# Patient Record
Sex: FEMALE | Race: WHITE | NOT HISPANIC OR LATINO
[De-identification: names, ages, dates, MRNs, and addresses within clinical notes are randomized per-mention and may not be internally consistent; named-entity substitution may affect disease eponyms.]

---

## 2017-01-09 ENCOUNTER — RX RENEWAL (OUTPATIENT)
Age: 77
End: 2017-01-09

## 2018-05-01 ENCOUNTER — APPOINTMENT (OUTPATIENT)
Dept: RADIOLOGY | Facility: CLINIC | Age: 78
End: 2018-05-01

## 2018-05-01 ENCOUNTER — OUTPATIENT (OUTPATIENT)
Dept: OUTPATIENT SERVICES | Facility: HOSPITAL | Age: 78
LOS: 1 days | End: 2018-05-01
Payer: MEDICARE

## 2018-05-01 DIAGNOSIS — Z00.8 ENCOUNTER FOR OTHER GENERAL EXAMINATION: ICD-10-CM

## 2018-05-01 PROCEDURE — 72070 X-RAY EXAM THORAC SPINE 2VWS: CPT | Mod: 26

## 2018-05-01 PROCEDURE — 72070 X-RAY EXAM THORAC SPINE 2VWS: CPT

## 2018-05-24 ENCOUNTER — EMERGENCY (EMERGENCY)
Facility: HOSPITAL | Age: 78
LOS: 1 days | Discharge: HOME | End: 2018-05-24
Attending: EMERGENCY MEDICINE | Admitting: EMERGENCY MEDICINE
Payer: MEDICARE

## 2018-05-24 VITALS
SYSTOLIC BLOOD PRESSURE: 130 MMHG | HEIGHT: 63 IN | TEMPERATURE: 98 F | HEART RATE: 67 BPM | OXYGEN SATURATION: 95 % | RESPIRATION RATE: 16 BRPM | DIASTOLIC BLOOD PRESSURE: 92 MMHG | WEIGHT: 186.07 LBS

## 2018-05-24 VITALS
TEMPERATURE: 98 F | OXYGEN SATURATION: 98 % | SYSTOLIC BLOOD PRESSURE: 135 MMHG | DIASTOLIC BLOOD PRESSURE: 62 MMHG | RESPIRATION RATE: 16 BRPM | HEART RATE: 47 BPM

## 2018-05-24 DIAGNOSIS — I25.10 ATHEROSCLEROTIC HEART DISEASE OF NATIVE CORONARY ARTERY WITHOUT ANGINA PECTORIS: ICD-10-CM

## 2018-05-24 DIAGNOSIS — I10 ESSENTIAL (PRIMARY) HYPERTENSION: ICD-10-CM

## 2018-05-24 DIAGNOSIS — Z98.890 OTHER SPECIFIED POSTPROCEDURAL STATES: Chronic | ICD-10-CM

## 2018-05-24 DIAGNOSIS — Z95.1 PRESENCE OF AORTOCORONARY BYPASS GRAFT: Chronic | ICD-10-CM

## 2018-05-24 DIAGNOSIS — R00.2 PALPITATIONS: ICD-10-CM

## 2018-05-24 DIAGNOSIS — I48.91 UNSPECIFIED ATRIAL FIBRILLATION: ICD-10-CM

## 2018-05-24 DIAGNOSIS — J44.9 CHRONIC OBSTRUCTIVE PULMONARY DISEASE, UNSPECIFIED: ICD-10-CM

## 2018-05-24 LAB
ALBUMIN SERPL ELPH-MCNC: 3.5 G/DL — SIGNIFICANT CHANGE UP (ref 3.3–5)
ALP SERPL-CCNC: 52 U/L — SIGNIFICANT CHANGE UP (ref 30–120)
ALT FLD-CCNC: 31 U/L DA — SIGNIFICANT CHANGE UP (ref 10–60)
ANION GAP SERPL CALC-SCNC: 10 MMOL/L — SIGNIFICANT CHANGE UP (ref 5–17)
APTT BLD: 39.6 SEC — HIGH (ref 27.5–37.4)
AST SERPL-CCNC: 24 U/L — SIGNIFICANT CHANGE UP (ref 10–40)
BASOPHILS # BLD AUTO: 0 K/UL — SIGNIFICANT CHANGE UP (ref 0–0.2)
BASOPHILS NFR BLD AUTO: 0.6 % — SIGNIFICANT CHANGE UP (ref 0–2)
BILIRUB SERPL-MCNC: 0.5 MG/DL — SIGNIFICANT CHANGE UP (ref 0.2–1.2)
BUN SERPL-MCNC: 28 MG/DL — HIGH (ref 7–23)
CALCIUM SERPL-MCNC: 9.6 MG/DL — SIGNIFICANT CHANGE UP (ref 8.4–10.5)
CHLORIDE SERPL-SCNC: 103 MMOL/L — SIGNIFICANT CHANGE UP (ref 96–108)
CK MB BLD-MCNC: 1.7 % — SIGNIFICANT CHANGE UP (ref 0–3.5)
CK MB CFR SERPL CALC: 2.1 NG/ML — SIGNIFICANT CHANGE UP (ref 0–3.6)
CK SERPL-CCNC: 125 U/L — SIGNIFICANT CHANGE UP (ref 26–192)
CO2 SERPL-SCNC: 27 MMOL/L — SIGNIFICANT CHANGE UP (ref 22–31)
CREAT SERPL-MCNC: 0.7 MG/DL — SIGNIFICANT CHANGE UP (ref 0.5–1.3)
D DIMER BLD IA.RAPID-MCNC: <150 NG/ML DDU — SIGNIFICANT CHANGE UP
EOSINOPHIL # BLD AUTO: 0.1 K/UL — SIGNIFICANT CHANGE UP (ref 0–0.5)
EOSINOPHIL NFR BLD AUTO: 1.8 % — SIGNIFICANT CHANGE UP (ref 0–6)
GLUCOSE SERPL-MCNC: 112 MG/DL — HIGH (ref 70–99)
HCT VFR BLD CALC: 41.4 % — SIGNIFICANT CHANGE UP (ref 34.5–45)
HGB BLD-MCNC: 15 G/DL — SIGNIFICANT CHANGE UP (ref 11.5–15.5)
INR BLD: 1.48 RATIO — HIGH (ref 0.88–1.16)
LYMPHOCYTES # BLD AUTO: 1.4 K/UL — SIGNIFICANT CHANGE UP (ref 1–3.3)
LYMPHOCYTES # BLD AUTO: 23.3 % — SIGNIFICANT CHANGE UP (ref 13–44)
MCHC RBC-ENTMCNC: 32.1 PG — SIGNIFICANT CHANGE UP (ref 27–34)
MCHC RBC-ENTMCNC: 36.3 GM/DL — HIGH (ref 32–36)
MCV RBC AUTO: 88.6 FL — SIGNIFICANT CHANGE UP (ref 80–100)
MONOCYTES # BLD AUTO: 0.6 K/UL — SIGNIFICANT CHANGE UP (ref 0–0.9)
MONOCYTES NFR BLD AUTO: 10.5 % — SIGNIFICANT CHANGE UP (ref 2–14)
NEUTROPHILS # BLD AUTO: 3.7 K/UL — SIGNIFICANT CHANGE UP (ref 1.8–7.4)
NEUTROPHILS NFR BLD AUTO: 63.8 % — SIGNIFICANT CHANGE UP (ref 43–77)
NT-PROBNP SERPL-SCNC: 1096 PG/ML — HIGH (ref 0–450)
PLATELET # BLD AUTO: 115 K/UL — LOW (ref 150–400)
POTASSIUM SERPL-MCNC: 3.2 MMOL/L — LOW (ref 3.5–5.3)
POTASSIUM SERPL-SCNC: 3.2 MMOL/L — LOW (ref 3.5–5.3)
PROT SERPL-MCNC: 7.3 G/DL — SIGNIFICANT CHANGE UP (ref 6–8.3)
PROTHROM AB SERPL-ACNC: 16.3 SEC — HIGH (ref 9.8–12.7)
RBC # BLD: 4.68 M/UL — SIGNIFICANT CHANGE UP (ref 3.8–5.2)
RBC # FLD: 11.6 % — SIGNIFICANT CHANGE UP (ref 10.3–14.5)
SODIUM SERPL-SCNC: 140 MMOL/L — SIGNIFICANT CHANGE UP (ref 135–145)
TROPONIN I SERPL-MCNC: 0 NG/ML — LOW (ref 0.02–0.06)
WBC # BLD: 5.9 K/UL — SIGNIFICANT CHANGE UP (ref 3.8–10.5)
WBC # FLD AUTO: 5.9 K/UL — SIGNIFICANT CHANGE UP (ref 3.8–10.5)

## 2018-05-24 PROCEDURE — 93971 EXTREMITY STUDY: CPT | Mod: 26,LT

## 2018-05-24 PROCEDURE — 85730 THROMBOPLASTIN TIME PARTIAL: CPT

## 2018-05-24 PROCEDURE — 82550 ASSAY OF CK (CPK): CPT

## 2018-05-24 PROCEDURE — 83880 ASSAY OF NATRIURETIC PEPTIDE: CPT

## 2018-05-24 PROCEDURE — 85610 PROTHROMBIN TIME: CPT

## 2018-05-24 PROCEDURE — 84484 ASSAY OF TROPONIN QUANT: CPT

## 2018-05-24 PROCEDURE — 36415 COLL VENOUS BLD VENIPUNCTURE: CPT

## 2018-05-24 PROCEDURE — 83735 ASSAY OF MAGNESIUM: CPT

## 2018-05-24 PROCEDURE — 82553 CREATINE MB FRACTION: CPT

## 2018-05-24 PROCEDURE — 80053 COMPREHEN METABOLIC PANEL: CPT

## 2018-05-24 PROCEDURE — 93005 ELECTROCARDIOGRAM TRACING: CPT

## 2018-05-24 PROCEDURE — 99285 EMERGENCY DEPT VISIT HI MDM: CPT

## 2018-05-24 PROCEDURE — 85379 FIBRIN DEGRADATION QUANT: CPT

## 2018-05-24 PROCEDURE — 85027 COMPLETE CBC AUTOMATED: CPT

## 2018-05-24 PROCEDURE — 71045 X-RAY EXAM CHEST 1 VIEW: CPT | Mod: 26

## 2018-05-24 PROCEDURE — 99284 EMERGENCY DEPT VISIT MOD MDM: CPT

## 2018-05-24 PROCEDURE — 93971 EXTREMITY STUDY: CPT

## 2018-05-24 PROCEDURE — 71045 X-RAY EXAM CHEST 1 VIEW: CPT

## 2018-05-24 PROCEDURE — 99284 EMERGENCY DEPT VISIT MOD MDM: CPT | Mod: 25

## 2018-05-24 PROCEDURE — 93010 ELECTROCARDIOGRAM REPORT: CPT

## 2018-05-24 PROCEDURE — 96374 THER/PROPH/DIAG INJ IV PUSH: CPT

## 2018-05-24 RX ORDER — SODIUM CHLORIDE 9 MG/ML
3 INJECTION INTRAMUSCULAR; INTRAVENOUS; SUBCUTANEOUS ONCE
Qty: 0 | Refills: 0 | Status: COMPLETED | OUTPATIENT
Start: 2018-05-24 | End: 2018-05-24

## 2018-05-24 RX ORDER — POTASSIUM CHLORIDE 20 MEQ
40 PACKET (EA) ORAL ONCE
Qty: 0 | Refills: 0 | Status: COMPLETED | OUTPATIENT
Start: 2018-05-24 | End: 2018-05-24

## 2018-05-24 RX ADMIN — Medication 40 MILLIEQUIVALENT(S): at 08:50

## 2018-05-24 RX ADMIN — Medication 40 MILLIEQUIVALENT(S): at 07:03

## 2018-05-24 RX ADMIN — SODIUM CHLORIDE 3 MILLILITER(S): 9 INJECTION INTRAMUSCULAR; INTRAVENOUS; SUBCUTANEOUS at 05:35

## 2018-05-24 NOTE — CONSULT NOTE ADULT - PROBLEM SELECTOR RECOMMENDATION 3
controlled currently , advised the patient to be compliant to diet as she is not compliant to diet which is contributing to her labile BP ,

## 2018-05-24 NOTE — CONSULT NOTE ADULT - PROBLEM SELECTOR RECOMMENDATION 2
PAF . with ST changes suggestive of ischemia possibly due to demand  underlying CAD , no evidence of chest pain or myocardial injury ,   K supplement , Magnesium level , advised to monitor electrolytes periodically    patient currently in sinus rhythm continue her home medication ,

## 2018-05-24 NOTE — ED PROVIDER NOTE - GASTROINTESTINAL, MLM
Patient presented after waiting 30 minutes with no reaction to  injections. Discharged from clinic.   Juju Landry RN  
Abdomen soft, non-tender, no guarding.

## 2018-05-24 NOTE — ED ADULT NURSE NOTE - DISCHARGE TEACHING
rest today, F/U with cardiologist as instructed, copies of all tests given to patient along with EKG's

## 2018-05-24 NOTE — ED ADULT NURSE NOTE - PMH
Atrial fibrillation    Chronic obstructive pulmonary disease    Coronary artery disease    Hypertension

## 2018-05-24 NOTE — ED PROVIDER NOTE - PROGRESS NOTE DETAILS
while speaking with pt appears to have converted to NSR - EKG, reassess Pt seen by cardiology and cleared for discharge home with outpatient follow up.  Has remained in NSR.  Stable for discharge home with outpatient follow up.  Copy of results provided, all questions answered

## 2018-05-24 NOTE — ED ADULT NURSE NOTE - OBJECTIVE STATEMENT
78 yr old female c/o irregular heartbeat; pt states she woke up to use restroom at home and felt her heart beating irregular; hx of Afib. Pt states she started to cough and thought maybe she had fluid in the lungs so came to ED for evaluation.

## 2018-05-24 NOTE — ED PROVIDER NOTE - OBJECTIVE STATEMENT
78 y.o. F c/o palpitations 78 y.o. F c/o palpitations, has h/o paroxysmal afib, again in afib now for about 2 hours, took a metoprolol 25mg when it started, thinks it might be a little 78 y.o. F c/o palpitations, has h/o paroxysmal afib, again in afib now for about 2 hours, took a metoprolol 25mg when it started, thinks it might be a little better now, but also feels a little SOB, not like her COPD, feels wet in the lungs, like fluid, has had a mild cough since the HR went up, no recent illness, did drive to and from [place] recently, last time she was in Afib was transferred to [place] for admission (Cards = Mec[facility])    PCP = Cipriano 78 y.o. F c/o palpitations, has h/o paroxysmal afib, again in afib now for about 2 hours, took a metoprolol 25mg when it started, thinks it might be a little better now, but also feels a little SOB, not like her COPD, feels wet in the lungs, like fluid, has had a mild cough since the HR went up, no recent illness, did drive to and from [place], returning last night, was her granddaughter's graduation, did have alcohol while there, last time she was in Afib was transferred to [place] for admission (Cards = Meche)    PCP = Cipriano

## 2018-05-24 NOTE — ED ADULT NURSE REASSESSMENT NOTE - NS ED NURSE REASSESS COMMENT FT1
07:30-patient on stretcher TR4 in ER awaiting to see cardiologist.  Patient converted from A-Fib to NSR.  Patient comfortable and denies pain at this time.

## 2018-05-24 NOTE — CONSULT NOTE ADULT - PROBLEM SELECTOR RECOMMENDATION 4
no chest pain or shortness of breath , with negative initial troponin ,   EKG changes possible due to rate related demand ischemia ,   would recommend her to be followed with her O P cardiologist echo and nuclear stress test,     can be discharged if her repeat troponin is negative

## 2018-05-24 NOTE — CONSULT NOTE ADULT - SUBJECTIVE AND OBJECTIVE BOX
REASON FOR CONSULT: AFIB    CHIEF COMPLAINT: palpitation     HPI: 78 year old female with hx of CAD CABG x 4 v  PAF HTN  HLD , COPD  obesity who came to hospital she developed palpitations started last night , was rapid irregular , not associated with shortness of breath or chest pain or dizziness , was not going away , brought in to ER  , where she was noted to have atrial fibrillation with rapid ventricular rate , patient recieved  10 mg IV cardizem  which converted to sinus , patient symptoms resolved , patients blood work showed hypokalemia , with mild pre renal azotemia.  negative troponin , patient currently has controlled BP , sinus bradycardia       PAST MEDICAL & SURGICAL HISTORY:  Hypertension  Coronary artery disease  Atrial fibrillation  Chronic obstructive pulmonary disease  S/P repair of paraesophageal hernia  History of quadruple bypass      Allergies    sulfa drugs (Other)    Intolerances        SOCIAL HISTORY: former smoker     FAMILY HISTORY: Not significant       MEDICATIONS:    Home Medications:  folic acid 1 mg oral tablet: 1 tab(s) orally once a day (24 May 2018 07:34)  hydroCHLOROthiazide 12.5 mg oral tablet: orally 2 times a day (24 May 2018 07:34)  labetalol 100 mg oral tablet: 1 tab(s) orally 2 times a day (24 May 2018 07:34)  Lipitor 40 mg oral tablet: 1 tab(s) orally once a day (at bedtime) (24 May 2018 07:34)  losartan 50 mg oral tablet: orally 2 times a day (24 May 2018 07:34)  sertraline: 25 milligram(s) orally (24 May 2018 07:34)  Symbicort: inhaled 2 times a day (24 May 2018 07:34)  Vitamin D3 10,000 intl units oral capsule: 1 cap(s) orally once a week (24 May 2018 07:34)  Xarelto 20 mg oral tablet: 1 tab(s) orally once a day (in the evening) (24 May 2018 07:34)  Zetia 10 mg oral tablet: 3xweek (24 May 2018 07:34)    MEDICATIONS  (STANDING):    MEDICATIONS  (PRN):      REVIEW OF SYSTEMS:    chronic back pain ,   All other review of systems is negative unless indicated above    Vital Signs Last 24 Hrs  T(C): 36.6 (24 May 2018 07:30), Max: 36.7 (24 May 2018 05:04)  T(F): 97.9 (24 May 2018 07:30), Max: 98 (24 May 2018 05:04)  HR: 49 (24 May 2018 07:30) (49 - 103)  BP: 118/76 (24 May 2018 07:30) (118/76 - 161/73)  BP(mean): --  RR: 16 (24 May 2018 07:30) (14 - 19)  SpO2: 97% (24 May 2018 07:30) (94% - 97%)    I&O's Summary      PHYSICAL EXAM:    Constitutional: NAD, awake and alert, obesity   HEENT: PERR, EOMI,  No oral cyananosis.  Neck:  supple,  No JVD  Respiratory: Breath sounds are clear bilaterally, No wheezing, rales or rhonchi  Cardiovascular: S1 and S2, regular rate and rhythm, no Murmurs, gallops or rubs  Gastrointestinal: Bowel Sounds present, soft, nontender.   Extremities: No peripheral edema. No clubbing or cyanosis.  Vascular: 2+ peripheral pulses  Neurological: A/O x 3, no focal deficits  Musculoskeletal: no calf tenderness.  Skin: No rashes.      LABS: All Labs Reviewed:                        15.0   5.9   )-----------( 115      ( 24 May 2018 05:52 )             41.4     24 May 2018 05:52    140    |  103    |  28     ----------------------------<  112    3.2     |  27     |  0.70     Ca    9.6        24 May 2018 05:52    TPro  7.3    /  Alb  3.5    /  TBili  0.5    /  DBili  x      /  AST  24     /  ALT  31     /  AlkPhos  52     24 May 2018 05:52    PT/INR - ( 24 May 2018 05:52 )   PT: 16.3 sec;   INR: 1.48 ratio         PTT - ( 24 May 2018 05:52 )  PTT:39.6 sec  CARDIAC MARKERS ( 24 May 2018 05:52 )  .004 ng/mL / x     / 125 U/L / x     / 2.1 ng/mL      Blood Culture:    @ 05:52  Pro Bnp 1096        RADIOLOGY/EK18  5:21 AM  atrial fibrillation with mild rapid rate 108  AS infarct , ST depression Inferolateral leads  18  6:59 AM  sinus bradycardia AS infarct , resolved ST depression

## 2018-05-24 NOTE — ED ADULT TRIAGE NOTE - CHIEF COMPLAINT QUOTE
'I am having afib,  my systolic and diastolic blood pressure is high, I am feeling fluid in my throat'.

## 2018-05-24 NOTE — ED PROVIDER NOTE - PMH
Atrial fibrillation    Chronic obstructive pulmonary disease Atrial fibrillation    Chronic obstructive pulmonary disease    Coronary artery disease    Hypertension

## 2018-05-24 NOTE — ED ADULT NURSE NOTE - CHPI ED SYMPTOMS NEG
no shortness of breath/no vomiting/no dizziness/no fever/no chills/no back pain/no chest pain/no diaphoresis/no nausea/no syncope

## 2018-07-16 ENCOUNTER — APPOINTMENT (OUTPATIENT)
Dept: ORTHOPEDIC SURGERY | Facility: CLINIC | Age: 78
End: 2018-07-16
Payer: MEDICARE

## 2018-07-16 VITALS — RESPIRATION RATE: 16 BRPM | BODY MASS INDEX: 32.6 KG/M2 | WEIGHT: 184 LBS | HEIGHT: 63 IN

## 2018-07-16 DIAGNOSIS — M19.042 PRIMARY OSTEOARTHRITIS, LEFT HAND: ICD-10-CM

## 2018-07-16 DIAGNOSIS — M19.041 PRIMARY OSTEOARTHRITIS, RIGHT HAND: ICD-10-CM

## 2018-07-16 PROCEDURE — 73110 X-RAY EXAM OF WRIST: CPT | Mod: 50

## 2018-07-16 PROCEDURE — 99214 OFFICE O/P EST MOD 30 MIN: CPT

## 2018-07-20 ENCOUNTER — EMERGENCY (EMERGENCY)
Facility: HOSPITAL | Age: 78
LOS: 1 days | Discharge: HOME | End: 2018-07-20
Attending: EMERGENCY MEDICINE | Admitting: EMERGENCY MEDICINE
Payer: MEDICARE

## 2018-07-20 VITALS
OXYGEN SATURATION: 97 % | HEART RATE: 57 BPM | WEIGHT: 179.9 LBS | HEIGHT: 63 IN | DIASTOLIC BLOOD PRESSURE: 43 MMHG | SYSTOLIC BLOOD PRESSURE: 87 MMHG | RESPIRATION RATE: 16 BRPM | TEMPERATURE: 98 F

## 2018-07-20 VITALS — TEMPERATURE: 98 F | SYSTOLIC BLOOD PRESSURE: 138 MMHG | HEART RATE: 58 BPM | DIASTOLIC BLOOD PRESSURE: 74 MMHG

## 2018-07-20 DIAGNOSIS — Z95.1 PRESENCE OF AORTOCORONARY BYPASS GRAFT: Chronic | ICD-10-CM

## 2018-07-20 DIAGNOSIS — Z98.890 OTHER SPECIFIED POSTPROCEDURAL STATES: Chronic | ICD-10-CM

## 2018-07-20 PROBLEM — I48.91 UNSPECIFIED ATRIAL FIBRILLATION: Chronic | Status: ACTIVE | Noted: 2018-05-24

## 2018-07-20 PROBLEM — J44.9 CHRONIC OBSTRUCTIVE PULMONARY DISEASE, UNSPECIFIED: Chronic | Status: ACTIVE | Noted: 2018-05-24

## 2018-07-20 PROBLEM — I10 ESSENTIAL (PRIMARY) HYPERTENSION: Chronic | Status: ACTIVE | Noted: 2018-05-24

## 2018-07-20 PROBLEM — I25.10 ATHEROSCLEROTIC HEART DISEASE OF NATIVE CORONARY ARTERY WITHOUT ANGINA PECTORIS: Chronic | Status: ACTIVE | Noted: 2018-05-24

## 2018-07-20 LAB
ALBUMIN SERPL ELPH-MCNC: 3.7 G/DL — SIGNIFICANT CHANGE UP (ref 3.3–5)
ALP SERPL-CCNC: 43 U/L — SIGNIFICANT CHANGE UP (ref 30–120)
ALT FLD-CCNC: 33 U/L DA — SIGNIFICANT CHANGE UP (ref 10–60)
ANION GAP SERPL CALC-SCNC: 3 MMOL/L — LOW (ref 5–17)
APPEARANCE UR: CLEAR — SIGNIFICANT CHANGE UP
AST SERPL-CCNC: 24 U/L — SIGNIFICANT CHANGE UP (ref 10–40)
BACTERIA # UR AUTO: ABNORMAL
BILIRUB SERPL-MCNC: 0.4 MG/DL — SIGNIFICANT CHANGE UP (ref 0.2–1.2)
BILIRUB UR-MCNC: NEGATIVE — SIGNIFICANT CHANGE UP
BUN SERPL-MCNC: 28 MG/DL — HIGH (ref 7–23)
CALCIUM SERPL-MCNC: 9.4 MG/DL — SIGNIFICANT CHANGE UP (ref 8.4–10.5)
CHLORIDE SERPL-SCNC: 102 MMOL/L — SIGNIFICANT CHANGE UP (ref 96–108)
CK MB BLD-MCNC: 2.1 % — SIGNIFICANT CHANGE UP (ref 0–3.5)
CK MB CFR SERPL CALC: 1.5 NG/ML — SIGNIFICANT CHANGE UP (ref 0–3.6)
CK SERPL-CCNC: 71 U/L — SIGNIFICANT CHANGE UP (ref 26–192)
CO2 SERPL-SCNC: 30 MMOL/L — SIGNIFICANT CHANGE UP (ref 22–31)
COLOR SPEC: YELLOW — SIGNIFICANT CHANGE UP
CREAT SERPL-MCNC: 0.87 MG/DL — SIGNIFICANT CHANGE UP (ref 0.5–1.3)
DIFF PNL FLD: NEGATIVE — SIGNIFICANT CHANGE UP
EPI CELLS # UR: SIGNIFICANT CHANGE UP
GLUCOSE SERPL-MCNC: 90 MG/DL — SIGNIFICANT CHANGE UP (ref 70–99)
GLUCOSE UR QL: NEGATIVE MG/DL — SIGNIFICANT CHANGE UP
HCT VFR BLD CALC: 41.7 % — SIGNIFICANT CHANGE UP (ref 34.5–45)
HGB BLD-MCNC: 14.1 G/DL — SIGNIFICANT CHANGE UP (ref 11.5–15.5)
INR BLD: 1.25 RATIO — HIGH (ref 0.88–1.16)
KETONES UR-MCNC: NEGATIVE — SIGNIFICANT CHANGE UP
LEUKOCYTE ESTERASE UR-ACNC: ABNORMAL
MCHC RBC-ENTMCNC: 31.3 PG — SIGNIFICANT CHANGE UP (ref 27–34)
MCHC RBC-ENTMCNC: 33.8 GM/DL — SIGNIFICANT CHANGE UP (ref 32–36)
MCV RBC AUTO: 92.5 FL — SIGNIFICANT CHANGE UP (ref 80–100)
NITRITE UR-MCNC: NEGATIVE — SIGNIFICANT CHANGE UP
NRBC # BLD: 0 /100 WBCS — SIGNIFICANT CHANGE UP (ref 0–0)
PH UR: 7 — SIGNIFICANT CHANGE UP (ref 5–8)
PLATELET # BLD AUTO: 118 K/UL — LOW (ref 150–400)
POTASSIUM SERPL-MCNC: 3.9 MMOL/L — SIGNIFICANT CHANGE UP (ref 3.5–5.3)
POTASSIUM SERPL-SCNC: 3.9 MMOL/L — SIGNIFICANT CHANGE UP (ref 3.5–5.3)
PROT SERPL-MCNC: 7.1 G/DL — SIGNIFICANT CHANGE UP (ref 6–8.3)
PROT UR-MCNC: NEGATIVE MG/DL — SIGNIFICANT CHANGE UP
PROTHROM AB SERPL-ACNC: 13.7 SEC — HIGH (ref 9.8–12.7)
RBC # BLD: 4.51 M/UL — SIGNIFICANT CHANGE UP (ref 3.8–5.2)
RBC # FLD: 12.8 % — SIGNIFICANT CHANGE UP (ref 10.3–14.5)
RBC CASTS # UR COMP ASSIST: SIGNIFICANT CHANGE UP /HPF (ref 0–4)
SODIUM SERPL-SCNC: 135 MMOL/L — SIGNIFICANT CHANGE UP (ref 135–145)
SP GR SPEC: 1 — LOW (ref 1.01–1.02)
TROPONIN I SERPL-MCNC: 0 NG/ML — LOW (ref 0.02–0.06)
UROBILINOGEN FLD QL: NEGATIVE MG/DL — SIGNIFICANT CHANGE UP
WBC # BLD: 5.99 K/UL — SIGNIFICANT CHANGE UP (ref 3.8–10.5)
WBC # FLD AUTO: 5.99 K/UL — SIGNIFICANT CHANGE UP (ref 3.8–10.5)
WBC UR QL: SIGNIFICANT CHANGE UP

## 2018-07-20 PROCEDURE — 99284 EMERGENCY DEPT VISIT MOD MDM: CPT

## 2018-07-20 PROCEDURE — 99284 EMERGENCY DEPT VISIT MOD MDM: CPT | Mod: 25

## 2018-07-20 PROCEDURE — 80053 COMPREHEN METABOLIC PANEL: CPT

## 2018-07-20 PROCEDURE — 84484 ASSAY OF TROPONIN QUANT: CPT

## 2018-07-20 PROCEDURE — 82553 CREATINE MB FRACTION: CPT

## 2018-07-20 PROCEDURE — 93005 ELECTROCARDIOGRAM TRACING: CPT

## 2018-07-20 PROCEDURE — 85027 COMPLETE CBC AUTOMATED: CPT

## 2018-07-20 PROCEDURE — 81001 URINALYSIS AUTO W/SCOPE: CPT

## 2018-07-20 PROCEDURE — 85610 PROTHROMBIN TIME: CPT

## 2018-07-20 PROCEDURE — 82550 ASSAY OF CK (CPK): CPT

## 2018-07-20 PROCEDURE — 93010 ELECTROCARDIOGRAM REPORT: CPT

## 2018-07-20 RX ORDER — LABETALOL HCL 100 MG
1 TABLET ORAL
Qty: 0 | Refills: 0 | COMMUNITY

## 2018-07-20 RX ORDER — BUDESONIDE AND FORMOTEROL FUMARATE DIHYDRATE 160; 4.5 UG/1; UG/1
0 AEROSOL RESPIRATORY (INHALATION)
Qty: 0 | Refills: 0 | COMMUNITY

## 2018-07-20 RX ORDER — LOSARTAN POTASSIUM 100 MG/1
0 TABLET, FILM COATED ORAL
Qty: 0 | Refills: 0 | COMMUNITY

## 2018-07-20 RX ORDER — SODIUM CHLORIDE 9 MG/ML
1000 INJECTION INTRAMUSCULAR; INTRAVENOUS; SUBCUTANEOUS ONCE
Qty: 0 | Refills: 0 | Status: COMPLETED | OUTPATIENT
Start: 2018-07-20 | End: 2018-07-20

## 2018-07-20 RX ORDER — SERTRALINE 25 MG/1
25 TABLET, FILM COATED ORAL
Qty: 0 | Refills: 0 | COMMUNITY

## 2018-07-20 RX ADMIN — SODIUM CHLORIDE 1000 MILLILITER(S): 9 INJECTION INTRAMUSCULAR; INTRAVENOUS; SUBCUTANEOUS at 15:55

## 2018-07-20 NOTE — ED PROVIDER NOTE - PROGRESS NOTE DETAILS
paged Dr. Álvarez [phone], awaiting call back spoke with Dr. Rodriguez, kojo discussed, patient to hold losartan and hctz, will see in the office on monday

## 2018-07-20 NOTE — ED PROVIDER NOTE - OBJECTIVE STATEMENT
79 y/o F pt with hx of vaso-vagal, Quadruple Bypass, CAD with Cardiac stents, A-Fib, COPD, HTN presents to ED c/o lightheadedness, weakness starting approximately 12:30pm today. Due to symptoms she took her blood pressure noted to be low at 77/54; called her cardiologist and advised to come to ED. Currently she is feeling better. Woke up feeling normal, took medication Labetalol, losartan, hydrochlorothiazide at 11:30am. Medication decreased over the past few weeks. No Lasix medication. Former Smoker. Denies dizziness, chest pain. No further complaints at this time.   Card:  Dr. Álvarez [phone]

## 2018-07-20 NOTE — ED PROVIDER NOTE - NS_ ATTENDINGSCRIBEDETAILS _ED_A_ED_FT
The scribe's documentation has been prepared under my direction and personally reviewed by me in its entirety. I confirm that the note above accurately reflects all work, treatment, procedures, and medical decision making performed by me (Dr. Rojas).

## 2019-04-22 ENCOUNTER — OUTPATIENT (OUTPATIENT)
Dept: OUTPATIENT SERVICES | Facility: HOSPITAL | Age: 79
LOS: 1 days | End: 2019-04-22
Payer: MEDICARE

## 2019-04-22 ENCOUNTER — APPOINTMENT (OUTPATIENT)
Dept: RADIOLOGY | Facility: CLINIC | Age: 79
End: 2019-04-22
Payer: MEDICARE

## 2019-04-22 DIAGNOSIS — Z98.890 OTHER SPECIFIED POSTPROCEDURAL STATES: Chronic | ICD-10-CM

## 2019-04-22 DIAGNOSIS — Z00.8 ENCOUNTER FOR OTHER GENERAL EXAMINATION: ICD-10-CM

## 2019-04-22 DIAGNOSIS — Z95.1 PRESENCE OF AORTOCORONARY BYPASS GRAFT: Chronic | ICD-10-CM

## 2019-04-22 PROCEDURE — 27093 INJECTION FOR HIP X-RAY: CPT | Mod: RT

## 2019-04-22 PROCEDURE — 27093 INJECTION FOR HIP X-RAY: CPT

## 2019-04-22 PROCEDURE — 73525 CONTRAST X-RAY OF HIP: CPT

## 2019-04-22 PROCEDURE — 73525 CONTRAST X-RAY OF HIP: CPT | Mod: 26,RT

## 2019-08-16 ENCOUNTER — EMERGENCY (EMERGENCY)
Facility: HOSPITAL | Age: 79
LOS: 1 days | Discharge: HOME | End: 2019-08-16
Attending: EMERGENCY MEDICINE | Admitting: EMERGENCY MEDICINE
Payer: MEDICARE

## 2019-08-16 VITALS
RESPIRATION RATE: 16 BRPM | WEIGHT: 171.96 LBS | DIASTOLIC BLOOD PRESSURE: 85 MMHG | TEMPERATURE: 98 F | OXYGEN SATURATION: 98 % | SYSTOLIC BLOOD PRESSURE: 162 MMHG | HEIGHT: 63 IN | HEART RATE: 58 BPM

## 2019-08-16 VITALS
DIASTOLIC BLOOD PRESSURE: 82 MMHG | TEMPERATURE: 98 F | RESPIRATION RATE: 15 BRPM | HEART RATE: 64 BPM | OXYGEN SATURATION: 97 % | SYSTOLIC BLOOD PRESSURE: 160 MMHG

## 2019-08-16 DIAGNOSIS — Z98.890 OTHER SPECIFIED POSTPROCEDURAL STATES: Chronic | ICD-10-CM

## 2019-08-16 DIAGNOSIS — Z95.1 PRESENCE OF AORTOCORONARY BYPASS GRAFT: Chronic | ICD-10-CM

## 2019-08-16 PROCEDURE — 96375 TX/PRO/DX INJ NEW DRUG ADDON: CPT

## 2019-08-16 PROCEDURE — 99284 EMERGENCY DEPT VISIT MOD MDM: CPT

## 2019-08-16 PROCEDURE — 72100 X-RAY EXAM L-S SPINE 2/3 VWS: CPT | Mod: 26

## 2019-08-16 PROCEDURE — 99284 EMERGENCY DEPT VISIT MOD MDM: CPT | Mod: 25

## 2019-08-16 PROCEDURE — 96374 THER/PROPH/DIAG INJ IV PUSH: CPT

## 2019-08-16 PROCEDURE — 72100 X-RAY EXAM L-S SPINE 2/3 VWS: CPT

## 2019-08-16 PROCEDURE — 72170 X-RAY EXAM OF PELVIS: CPT

## 2019-08-16 PROCEDURE — 72170 X-RAY EXAM OF PELVIS: CPT | Mod: 26

## 2019-08-16 RX ORDER — LIDOCAINE 4 G/100G
1 CREAM TOPICAL ONCE
Refills: 0 | Status: COMPLETED | OUTPATIENT
Start: 2019-08-16 | End: 2019-08-16

## 2019-08-16 RX ORDER — HYDROMORPHONE HYDROCHLORIDE 2 MG/ML
0.5 INJECTION INTRAMUSCULAR; INTRAVENOUS; SUBCUTANEOUS ONCE
Refills: 0 | Status: DISCONTINUED | OUTPATIENT
Start: 2019-08-16 | End: 2019-08-16

## 2019-08-16 RX ORDER — DEXAMETHASONE 0.5 MG/5ML
10 ELIXIR ORAL ONCE
Refills: 0 | Status: COMPLETED | OUTPATIENT
Start: 2019-08-16 | End: 2019-08-16

## 2019-08-16 RX ORDER — LOSARTAN POTASSIUM 100 MG/1
1 TABLET, FILM COATED ORAL
Qty: 0 | Refills: 0 | DISCHARGE

## 2019-08-16 RX ADMIN — LIDOCAINE 1 PATCH: 4 CREAM TOPICAL at 16:29

## 2019-08-16 RX ADMIN — HYDROMORPHONE HYDROCHLORIDE 0.5 MILLIGRAM(S): 2 INJECTION INTRAMUSCULAR; INTRAVENOUS; SUBCUTANEOUS at 16:29

## 2019-08-16 RX ADMIN — Medication 102 MILLIGRAM(S): at 16:29

## 2019-08-16 NOTE — ED PROVIDER NOTE - NEUROLOGICAL, MLM
Alert and oriented, no focal deficits, no motor or sensory deficits. able to SLR. able to stand from sitting position. no toe or foot drop

## 2019-08-16 NOTE — ED PROVIDER NOTE - ATTENDING CONTRIBUTION TO CARE
pt with acute on chronic LBP. no new trauma, no urinary symptoms, no fevers.  pain control dc with pain management and ortho.

## 2019-08-16 NOTE — ED PROVIDER NOTE - CHPI ED SYMPTOMS NEG
no bladder dysfunction/no numbness/no bowel dysfunction/no motor function loss/no tingling/no anorexia/no constipation

## 2019-08-16 NOTE — ED PROVIDER NOTE - NEURO NEGATIVE STATEMENT, MLM
no headache, no sensory deficits, and no weakness. no loss or bowel or bladder control. no numbness in groin

## 2019-08-16 NOTE — ED ADULT NURSE NOTE - NSIMPLEMENTINTERV_GEN_ALL_ED
Implemented All Fall with Harm Risk Interventions:  [place] to call system. Call bell, personal items and telephone within reach. Instruct patient to call for assistance. Room bathroom lighting operational. Non-slip footwear when patient is off stretcher. Physically safe environment: no spills, clutter or unnecessary equipment. Stretcher in lowest position, wheels locked, appropriate side rails in place. Provide visual cue, wrist band, yellow gown, etc. Monitor gait and stability. Monitor for mental status changes and reorient to person, place, and time. Review medications for side effects contributing to fall risk. Reinforce activity limits and safety measures with patient and family. Provide visual clues: red socks.

## 2019-08-16 NOTE — ED PROVIDER NOTE - MUSCULOSKELETAL, MLM
diffuse soft tissue tenderness bilateral lumbar region (left worse than right). no vert tenderness or step off deformities. no ext tenderness or swelling

## 2019-08-16 NOTE — ED PROVIDER NOTE - PROGRESS NOTE DETAILS
Reevaluated patient at bedside.  Patient feeling much improved.  ambulatory with steady gait. Discussed the results of all diagnostic testing in ED and copies of all reports given.   An opportunity to ask questions was given.  Discussed the importance of prompt, close medical follow-up.  Patient will return with any changes, concerns or persistent / worsening symptoms.  Understanding of all instructions verbalized.  will follow up with pain management in 4 days as scheduled. has pain meds at home to use as needed and medrol dose pack to resume

## 2019-08-16 NOTE — ED PROVIDER NOTE - CLINICAL SUMMARY MEDICAL DECISION MAKING FREE TEXT BOX
acute exacerbation of chronic low back pain past 5 days after lying in bed for prolonged period. no red flags. no not suspect cord compression or cauda equina syndrome. halie x-ray, decadron, Dilaudid for pain. no fevers or redness to suggest infection

## 2019-08-16 NOTE — ED ADULT TRIAGE NOTE - HEART RATE (BEATS/MIN)
OFFICE PROGRESS NOTE / FOLLOW UP    Chelsy Umana is a 74 year old female who presented requesting follow up evaluation for right shoulder pain and right middle finger trigger. Patient reports trigger finger has improved but continues to lock at times.  The right shoulder is improving but knitting does irritate.  Patient scheduled to leave for [place] for 2-3 months next week.  Pain overall improved.  Intervention:  CSI, PT  Improvement:  Yes      Review of Systems:   no history of neck pain.  no history of radicular pain into upper extremities.    Current Outpatient Prescriptions   Medication   • doxepin (SINEQUAN) 10 MG capsule   • warfarin (COUMADIN) 5 MG tablet   • bisoprolol (ZEBETA) 5 MG tablet   • timolol (TIMOPTIC) 0.5 % ophthalmic solution   • azelaic acid 15 % gel   • Coenzyme Q10 (COQ10 PO)   • multivitamin (THERAGRAN) per tablet   • Calcium Carbonate (CALTRATE 600 PO)   • Omega-3 Fatty Acids (OMEGA-3 FISH OIL) 1000 MG CAPS     No current facility-administered medications for this visit.      Past Medical History:   Diagnosis Date   • Actinic keratosis     nose, right cheek   • Arthritis    • Blood clot associated with vein wall inflammation     PE X 2   • Colon polyp     03/2010   • Corns and callosities     left foot little toe   • Diverticulosis 4/15/2015   • Fibrocystic breast disease    • Hyperlipidemia    • Hypertension    • Inflammatory bowel disease    • Irritable bowel syndrome    • Malignant neoplasm (CMS/HCC) 2013    FACE, BACK BASAL CELL   • Osteoporosis    • Osteoporosis, unspecified 12/17/2012   • PE (pulmonary embolism)    • Rosacea    • Seborrheic keratosis    • Solar lentigo      Past Surgical History:   Procedure Laterality Date   • APPENDECTOMY     • COLONOSCOPY  4/15/15    repeat 5 to  7 years   • COLONOSCOPY W/ POLYPECTOMY  03/02/2010    rectal polyp   • EYE SURGERY     • JOINT REPLACEMENT      r knee   • LEFT HEART CATH,PERCUTANEOUS  09/14/2010   • OCCULT BLOOD  01/15/2009   • PAP  SMEAR,ROUTINE  06/15/2012   • PTCA      ptca   • SKIN CANCER EXCISION  2016; 1/07/2015; 2013    back; foreheadl left leg   • TOTAL KNEE REPLACEMENT  04/11/2011    Right knee   • TUBAL LIGATION       Social History     Occupational History   • Not on file.     Social History Main Topics   • Smoking status: Former Smoker   • Smokeless tobacco: Never Used   • Alcohol use 0.0 oz/week      Comment: rarely   • Drug use: Unknown   • Sexual activity: Not on file     ALLERGIES:   Allergen Reactions   • Adhesive RASH     Family History   Problem Relation Age of Onset   • Arthritis Mother    • OTHER Mother    • Diabetes Father    • Heart Father    • Cancer Son      skin   • Cancer Daughter      skin       There were no vitals taken for this visit.    OT / Measurements:  Digit ROM WFL with triggering of MF    Measurements from PT note dated 1/3/18  Active right shoulder flexion:      full                      Reach behind the back:  Reaches to approximately T5 with minimal discomfort      Right middle finger:  Continued triggering with active motion. Mild discomfort    Assessment:  Resolved shoulder pain  Continued right middle trigger finger    Plan:  Patient states she would like to try another trigger finger injection. She is leaving for 4 to  . If she continues to have symptoms she will return in the spring and consider surgery.    At this point since the patient is experiencing progressively worsening triggering symptoms, a steroid injection was recommended.  The procedure was outlined fully.  The risks of the procedure including:  Injury to nerve, artery, tendon, infection, and the possibility of failure of a complete resolution of symptoms were outlined to the patient.      Trigger Finger Injection Procedure Note  NDC #0517-072 0-0 1  Lot number:  [id number]    Indications:  The patient has symptoms of right middle trigger digit    Pre-procedure Diagnosis:  Right middle Trigger Finger    Post-procedure Diagnosis:   Same    Surgeon:  LISA FINE     Assistants:  None    Anesthesia:  Local    Procedure Details   Verbal consent was obtained from the patient after risks, benefits, and alternatives were discussed.  The patient's affected digit was marked over the A-1 pulley and after sterile prep with ChloraPrep.  A mix of 1 mL of 2% lidocaine and 1 mL of  6 mg of Celestone (total 2 mL) was infiltrated into the area of the A1 pulley.  Area was cleansed and Band-Aid was applied.           Complications:  None; patient tolerated the procedure well.                        58

## 2019-08-16 NOTE — ED ADULT NURSE NOTE - CHPI ED NUR SYMPTOMS NEG
no bowel dysfunction/no bladder dysfunction/no anorexia/no fatigue/no neck tenderness/no motor function loss/no numbness/no constipation/no tingling

## 2019-08-23 ENCOUNTER — APPOINTMENT (OUTPATIENT)
Dept: ORTHOPEDIC SURGERY | Facility: CLINIC | Age: 79
End: 2019-08-23

## 2019-09-03 ENCOUNTER — OUTPATIENT (OUTPATIENT)
Dept: OUTPATIENT SERVICES | Facility: HOSPITAL | Age: 79
LOS: 1 days | End: 2019-09-03
Payer: MEDICARE

## 2019-09-03 ENCOUNTER — APPOINTMENT (OUTPATIENT)
Dept: CT IMAGING | Facility: CLINIC | Age: 79
End: 2019-09-03
Payer: MEDICARE

## 2019-09-03 ENCOUNTER — APPOINTMENT (OUTPATIENT)
Dept: SPINE | Facility: CLINIC | Age: 79
End: 2019-09-03
Payer: MEDICARE

## 2019-09-03 VITALS
DIASTOLIC BLOOD PRESSURE: 78 MMHG | WEIGHT: 163 LBS | HEIGHT: 63 IN | BODY MASS INDEX: 28.88 KG/M2 | HEART RATE: 49 BPM | SYSTOLIC BLOOD PRESSURE: 167 MMHG

## 2019-09-03 DIAGNOSIS — Z95.1 PRESENCE OF AORTOCORONARY BYPASS GRAFT: Chronic | ICD-10-CM

## 2019-09-03 DIAGNOSIS — S22.009A UNSPECIFIED FRACTURE OF UNSPECIFIED THORACIC VERTEBRA, INITIAL ENCOUNTER FOR CLOSED FRACTURE: ICD-10-CM

## 2019-09-03 DIAGNOSIS — Z98.890 OTHER SPECIFIED POSTPROCEDURAL STATES: Chronic | ICD-10-CM

## 2019-09-03 PROCEDURE — 76376 3D RENDER W/INTRP POSTPROCES: CPT | Mod: 26

## 2019-09-03 PROCEDURE — 99203 OFFICE O/P NEW LOW 30 MIN: CPT

## 2019-09-03 PROCEDURE — 76376 3D RENDER W/INTRP POSTPROCES: CPT

## 2019-09-03 PROCEDURE — 72128 CT CHEST SPINE W/O DYE: CPT

## 2019-09-03 PROCEDURE — 72128 CT CHEST SPINE W/O DYE: CPT | Mod: 26

## 2019-09-03 NOTE — ASSESSMENT
[FreeTextEntry1] : Assess:\par Compression fracture T 12 \par \par \par PLAN:\par Conservative measures \par kyphoplasty not recommended\par Return in two weeksw\par CT scan of thoracic  spine

## 2019-09-03 NOTE — REASON FOR VISIT
[New Patient Visit] : a new patient visit [Spouse] : spouse [FreeTextEntry1] : Lumbar back pain and kyphoplasty evaluation for T 12 fractrure

## 2019-09-05 ENCOUNTER — APPOINTMENT (OUTPATIENT)
Dept: ORTHOPEDIC SURGERY | Facility: CLINIC | Age: 79
End: 2019-09-05

## 2019-09-05 ENCOUNTER — APPOINTMENT (OUTPATIENT)
Dept: INTERVENTIONAL RADIOLOGY/VASCULAR | Facility: CLINIC | Age: 79
End: 2019-09-05
Payer: MEDICARE

## 2019-09-05 VITALS
SYSTOLIC BLOOD PRESSURE: 174 MMHG | OXYGEN SATURATION: 99 % | HEART RATE: 56 BPM | TEMPERATURE: 97.8 F | RESPIRATION RATE: 18 BRPM | DIASTOLIC BLOOD PRESSURE: 94 MMHG

## 2019-09-05 DIAGNOSIS — Z87.19 PERSONAL HISTORY OF OTHER DISEASES OF THE DIGESTIVE SYSTEM: ICD-10-CM

## 2019-09-05 DIAGNOSIS — F41.8 OTHER SPECIFIED ANXIETY DISORDERS: ICD-10-CM

## 2019-09-05 DIAGNOSIS — Z87.09 PERSONAL HISTORY OF OTHER DISEASES OF THE RESPIRATORY SYSTEM: ICD-10-CM

## 2019-09-05 DIAGNOSIS — I48.0 PAROXYSMAL ATRIAL FIBRILLATION: ICD-10-CM

## 2019-09-05 DIAGNOSIS — Z12.11 ENCOUNTER FOR SCREENING FOR MALIGNANT NEOPLASM OF COLON: ICD-10-CM

## 2019-09-05 DIAGNOSIS — H25.89 OTHER AGE-RELATED CATARACT: ICD-10-CM

## 2019-09-05 DIAGNOSIS — R93.7 ABNORMAL FINDINGS ON DIAGNOSTIC IMAGING OF OTHER PARTS OF MUSCULOSKELETAL SYSTEM: ICD-10-CM

## 2019-09-05 PROCEDURE — 99205 OFFICE O/P NEW HI 60 MIN: CPT

## 2019-09-05 RX ORDER — EZETIMIBE 10 MG/1
10 TABLET ORAL
Qty: 90 | Refills: 3 | Status: DISCONTINUED | COMMUNITY
Start: 2017-01-09 | End: 2019-09-05

## 2019-09-09 ENCOUNTER — APPOINTMENT (OUTPATIENT)
Dept: INTERVENTIONAL RADIOLOGY/VASCULAR | Facility: CLINIC | Age: 79
End: 2019-09-09
Payer: MEDICARE

## 2019-09-09 VITALS
DIASTOLIC BLOOD PRESSURE: 90 MMHG | SYSTOLIC BLOOD PRESSURE: 152 MMHG | HEIGHT: 63 IN | WEIGHT: 158 LBS | HEART RATE: 94 BPM | BODY MASS INDEX: 28 KG/M2 | RESPIRATION RATE: 16 BRPM | OXYGEN SATURATION: 99 %

## 2019-09-09 DIAGNOSIS — S22.009A UNSPECIFIED FRACTURE OF UNSPECIFIED THORACIC VERTEBRA, INITIAL ENCOUNTER FOR CLOSED FRACTURE: ICD-10-CM

## 2019-09-09 PROCEDURE — 99205 OFFICE O/P NEW HI 60 MIN: CPT

## 2019-09-09 NOTE — PHYSICAL EXAM
[Alert] : alert [Normal Sclera/Conjunctiva] : normal sclera/conjunctiva [Normal Hearing] : hearing was normal [No Respiratory Distress] : no respiratory distress [Normal Rate and Effort] : normal respiratory rhythm and effort [No Accessory Muscle Use] : no accessory muscle use [No Motor Deficits] : the motor exam was normal [Normal Rate] : heart rate was normal  [Oriented x3] : oriented to person, place, and time [Pedal Pulses Normal] : the pedal pulses are present [Normal Strength/Tone] : muscle strength and tone were normal [de-identified] : visibly uncomfortable with position change/movement [de-identified] : Left ankle edema.  Pt states chronic from old injury [de-identified] : pinpoint tenderness to palpation of lower thoracic vertebrae and paraspinal muscles. [de-identified] : gait unsteady, ambulates with walker [de-identified] : 5/5 strength in all ext.

## 2019-09-09 NOTE — ASSESSMENT
[Other: _____] : [unfilled] [FreeTextEntry1] : This is a 80 y/o female with pmhx of CAD, PAF, HTN, HLD, osteoporosis, OA, GIB, and diverticulosis who presents today for consultation for vertebral kyphoplasty.  Her MRI from 8/20/19 demonstrated chronic compression deformity of the lumbar vertebral body with associated edema and an acute compression fracture involving the inferior endplate of the T12 vertebral body with adjacent paraspinal soft tissue edema. \par \par I have reviewed her case and imaging report with Dr. Richter.  After speaking with Ms. Jc and review of her MRI report, she could be a candidate for vertebral augmentation.  It appears that Ms. Jc has sustained a pathologic vertebral compression fracture likely secondary to osteoporosis, which interferes with their ability to perform ADL's independently and has been refractory to conservative medical therapy for the last few weeks.  I reviewed the kyphoplasty procedure, including the risks, benefits, alternatives, and expected post procedure course.\par \par I discussed the nature of compression fractures and how the majority of them will heal/significantly improve within 6-8 weeks of conservative therapy.  She does report mild improvement over the last few weeks, but has been in pain since stopping her pain regimen this past Tuesday.  I discussed how she could also take extra strength Tylenol every 8hrs as needed, if she preferred not to take opioids.  \par \par She also has an extensive cardiac history and is on Xarelto for her afib, so she will require cardiac clearance prior to her procedure to discuss holding a/c. \par \par I have provided the patient the opportunity to ask questions and have answered them to their satisfaction.  They are encouraged to contact our office with any further questions, concerns, or issues.  \par \par I have reviewed the above with Dr. Richter and offered the patient the option of follow up visit next week or deferring visit and monitoring symptoms for another 2 weeks.  Pt stated that she would like to have f/u visit next week.  [facility] booking office and Dr. Richter to arrange follow up. \par

## 2019-09-09 NOTE — REASON FOR VISIT
[Consultation] : a consultation visit [FreeTextEntry1] : T12 Vertebral Augmentation [Spouse] : spouse

## 2019-09-09 NOTE — ASSESSMENT
[FreeTextEntry1] : 78 y/o female with pmhx of CAD, PAF, HTN, HLD OA, GIB, and diverticulosis who presents with acute T12 and subacute/chronic L1 vertebral compression fractures secondary to osteoperosis.  Pain has been refractory to medical management thus far as has sequential adjacent vertebral body fractures.  Pain interferes with ability to perform ADLs.\par \par Given interval healing of L1 over approximately 4 months and recent diagnosis of T12 compression fracture, would recommend continued conservative management.\par \par Will obtain repeat MRI in 2-3 weeks to assess interval progression.  Based on repeat MRI and interval patient assessment, will then determine candidacy for vertebral augmentation in early October (6 weeks post diagnosis of T12 fracture)\par \par All questions asked and answered to completion and patient satisfaction. [Other: _____] : [unfilled]

## 2019-09-09 NOTE — PHYSICAL EXAM
[Alert] : alert [No Acute Distress] : no acute distress [Well Nourished] : well nourished [Well Developed] : well developed [Normal Voice/Communication] : normal voice communication [Normal Sclera/Conjunctiva] : normal sclera/conjunctiva [No Respiratory Distress] : no respiratory distress [Normal Rate and Effort] : normal respiratory rhythm and effort [No Accessory Muscle Use] : no accessory muscle use [Oriented x3] : oriented to person, place, and time [No Motor Deficits] : the motor exam was normal [Normal Insight/Judgement] : insight and judgment were intact [Normal Affect] : the affect was normal [Normal Mood] : the mood was normal [Recent Memory Normal] : recent memory was not impaired [Remote Memory Normal] : remote memory was not impaired [Ambulatory and capable of all selfcare but unable to carry out any work activities] : Ambulatory and capable of all selfcare but unable to carry out any work activities. Up and about more than 50% of waking hours [de-identified] : visibly uncomfortable with position change/movement [de-identified] : pinpoint tenderness to palpation of lower thorasic vertebrae.  [de-identified] : gait unsteady, ambulates with cane [de-identified] : 5/5 strength in all ext.

## 2019-09-09 NOTE — CONSULT LETTER
[Dear  ___] : Dear  [unfilled], [Consult Letter:] : I had the pleasure of evaluating your patient, [unfilled]. [Please see my note below.] : Please see my note below. [Consult Closing:] : Thank you very much for allowing me to participate in the care of this patient.  If you have any questions, please do not hesitate to contact me. [Sincerely,] : Sincerely, [FreeTextEntry2] : Dr. Jefferson Moreau  [FreeTextEntry3] : \par Rod Richter MD, FSIR\par Interventional Radiologist\par , Interventional Radiology Residency\par Assistant Professor of Radiology, [employer] at [facility]\par Vascular & Interventional Radiology, [facility]\par  \par [facility]\par P: [phone]\par P: [phone]\par F: [phone]\par [facility]\par P: [phone]\par F: [phone]\par \par \par

## 2019-09-09 NOTE — HISTORY OF PRESENT ILLNESS
[FreeTextEntry1] : This is a 80 y/o female with pmhx of CAD, PAF, HTN, HLD, osteoporosis, OA, GIB, and diverticulosis who presents today for consultation for vertebral kyphoplasty.  Pt reports having a right total hip replacement on 7/16/19 and approximately 2 weeks after she returned home, she developed mid back pain.  She was evaluated in early August by her pain management physician, Dr. Narvaez, and recommended PT and pain regimen.\par \par  After approximately 2 PT sessions, she noticed the pain to be worsening.  She was re-evaluated by her pain medicine doctor and sent to the ED for hypertensive urgency and pain control.  She was hospitalized at [facility] from 8/19-8/24 and found to have an acute fx of T12 vertebrae.  Once her b/p was stabilized, she was discharged with a regimen of Dilaudid and Tizanidine.  \par \par While the medication regimen did help decrease the pain, she became very lethargic and constipated.  Her last dose of medication was on Tuesday.   She was evaluated by Dr. Moreau after discharge and referred to IR for vertebral augmentation. \par  \par Reports the pain as constant deep aching, at the level of her bra strap, that is worse with activity (8/10) and better with rest (5-6/10).  She attempted acupuncture, rest, bracing, and nerve/steroid injection during her hospitalization without relief.  She was evaluated by Dr. Alatorre earlier this week and deemed not to be a surgical candidate at this time. Denies fever, chills, recent falls, or loss of control of her bowels.  Pt does report intermittent urge incontinence, but this is not a new finding, she has had it since her surgery in July.  \par \par Due to miscommunication, Dr. Richter was unavailable to evaluate the patient.  Pt offered to reschedule consult or have initial consult with NP.  Pt preferred to stay for consult today and will schedule a follow up with Dr. Richter next week.

## 2019-09-09 NOTE — DATA REVIEWED
[FreeTextEntry1] : CT and MRI spine images reviewed and results discussed at length with the patient. \par EXAM: 3D RECONSTRUCT WO WORKSTATION \par \par EXAM: CT THORACIC SPINE \par \par \par PROCEDURE DATE: 09/03/2019 \par \par \par \par INTERPRETATION: CT THORACIC SPINE, CT 3D RECONSTRUCTION WO WORKSTATION \par dated 9/3/2019 3:42 PM \par \par INDICATION: T12 compression fracture. Severe back pain. \par \par COMPARISON: Lumbar spine MRI dated 8/20/2019 from an outside institution \par \par TECHNIQUE: CT imaging of the thoracic spine was performed. The data was \par reformatted in the axial, coronal, and sagittal planes. Additionally, 3-D \par reformatted imaging was created at a separate workstation. \par \par FINDINGS: \par DISC LEVEL EVALUATION: \par \par T1/T2: No central canal or foraminal narrowing. \par T2/T3: Mild facet productive change. No central canal or foraminal narrowing. \par T3/T4: Mild to moderate facet productive change. No central canal foraminal \par narrowing. \par T4/T5: Mild facet productive change. No central canal or foraminal narrowing. \par T5/T6: Moderate facet productive change no central canal or foraminal \par narrowing. \par T6/T7: Mild to moderate facet productive change. No central canal foraminal \par narrowing. \par T7/T8: Mild bilateral facet productive change. No central canal or foraminal \par narrowing. \par T8/T9: Mild facet productive change. No central canal or foraminal narrowing. \par T9/T10: Mild to moderate facet productive change. No central canal or \par foraminal narrowing. \par T10/T11: Mild to moderate facet productive change. No central canal or \par foraminal narrowing. \par T11/T12: Mild to moderate facet productive changes. No central canal or \par foraminal narrowing. \par T12/L1: Moderate left and mild to moderate right facet productive change. \par Posterior osseous ridging and disc bulging noted asymmetric to left. There \par is mild left foraminal narrowing. No central canal or right foraminal \par narrowing. \par \par SPINAL ALIGNMENT: There is straightening of the mid and lower thoracic \par spine. Varying levels of disc space narrowing noted. Vacuum phenomenon \par appreciated at T11-12 an T12-L1. \par OSSEOUS STRUCTURES: As on the prior lumbar spine MRI, there are fracture \par deformities of the T12 and L1 vertebral bodies. No additional fracture \par deformity is appreciated. No retropulsion is noted. \par PARASPINAL MUSCLE AND SOFT TISSUES: Symmetric appearance of paraspinal \par musculature demonstrating mild atrophy. \par INTRAABDOMINAL/INTRATHORACIC SOFT TISSUES: There is a moderate-sized hiatal \par hernia. Surgical clips are seen at the level of the esophageal hiatus. There \par is dependent atelectatic change. There are vascular calcifications present \par surgical clips noted within the mediastinum. \par \par 3-D reformatted imaging confirms these findings. \par \par IMPRESSION: \par Mild to moderate multilevel spondylosis as above. \par Fracture deformities of the T12 and L1 vertebral bodies that are similar in \par appearance to the prior study given differences in technique. \par \par \par \par \par \par \par \par \par ROB LOWRY M.D., ATTENDING RADIOLOGIST \par This document has been electronically signed. Sep 8 2019 2:53PM \par \par MRI 8/20/2019  Acute T12 vertebral compression fracture.  Subacute/chronic L1 vertebral compression fracture with superior and inferior endplate schmorl's nodes.\par MRI 4/23/22019 Acute L1 vertebral compression fracture.

## 2019-09-09 NOTE — HISTORY OF PRESENT ILLNESS
[FreeTextEntry1] : This is a 80 y/o female with pmhx of CAD, PAF, HTN, HLD, osteoporosis, OA, GIB, and diverticulosis who presents today for consultation for vertebral augmentation. Pt reports having a right total hip replacement on 7/16/19 and approximately 2 weeks after she returned home, she developed mid back pain. She was evaluated in early August by her pain management physician, Dr. Narvaez, and recommended PT and pain regimen.\par \par After approximately 2 PT sessions for her hip, she noticed the hip pain to be worsening. She was re-evaluated by her pain medicine doctor and sent to the ED for hypertensive urgency and pain control. She was hospitalized at [facility] from 8/19-8/24 and found to have an acute fx of T12 vertebrae. Once her b/p was stabilized, she was discharged with a regimen of Dilaudid and Tizanidine. \par \par While the medication regimen did help decrease the pain, she became very lethargic and constipated. Her last dose of medication was on Tuesday. She was evaluated by Dr. Moreau after discharge and referred to IR for vertebral augmentation. \par  \par Reports the pain as constant deep aching, at the level of her bra strap, that is worse with activity (5/10) and better with rest (3-4/10). She attempted acupuncture, rest, bracing, and nerve/steroid injection during her hospitalization without relief. She was evaluated by Dr. Alatorre earlier this week and deemed not to be a surgical candidate at this time. \par \par She is able to lay flat in bed w/o excruciating pain, she is not able to stand for prolonged period of time, She is able to do her ADL's but is very limited due to pain. She is not able to cook which she was doing prior. \par \par Denies fever, chills, recent falls, or loss of control of her bowels. \par \par Pt does report intermittent urge incontinence, but this is not a new finding, she has had it since her surgery in July. \par \par Of note hx of Afib on Xarelto. She is aware to get cardiac clearance to hold Xarelto prior to procedure.  [Worsening] : worsening [___ wks] : [unfilled] week(s) ago [5] : ~His/Her~ pain was 5 out of 10 [Other___] : [unfilled] [Constant] : ~His/Her~ symptoms seem to be constant [Aching] : aching [Bending] : worsened by bending [Walking] : worsened by walking [Direct Pressure] : worsened by direct pressure [Lifting] : worsened by lifting [Prolonged Standing] : worsened with prolonged standing [Recumbency] : relieved by recumbency [Rest] : relieved by rest [de-identified] : 9/3/2019 Thoracic spine [de-identified] : 8/20/2019, 4/23/2019 Lumbar spine

## 2019-09-10 ENCOUNTER — OTHER (OUTPATIENT)
Age: 79
End: 2019-09-10

## 2019-09-17 ENCOUNTER — APPOINTMENT (OUTPATIENT)
Dept: SPINE | Facility: CLINIC | Age: 79
End: 2019-09-17

## 2019-10-19 NOTE — ED ADULT NURSE NOTE - CCCP TRG CHIEF CMPLNT
LOS: 3 days   Patient Care Team:  Guy Kelly MD as PCP - General (Family Medicine)    Chief Complaint: Postop tumor removal    Subjective     This patient says she feels pretty good.  She has a pretty severe headache as expected but no other complaints.  Her eye swelling has improved.    Interval History:     History taken from: patient chart family    Objective      She has good movement in all 4 extremities and seems to be communicating without difficulty.  Her incision looks good.    Vital Signs  Temp:  [97.7 °F (36.5 °C)-99.1 °F (37.3 °C)] 97.7 °F (36.5 °C)  Heart Rate:  [58-87] 58  Resp:  [15-17] 17  BP: ()/(59-64) 97/61       Results Review:     I reviewed the patient's new clinical results.  She is afebrile with a white count of 5.6.      Assessment/Plan       Meningioma (CMS/HCC)      I told the patient we need to stop the Dilaudid at this time.  She needs to maintain her pain medications on the Norco.  Over the next several days I would like her to reduce her use of narcotic medications as well.  We will check another CBC in the morning and if that looks okay she can probably go home tomorrow if she feels like it.  I also discussed the pathology report with the patient and her mother.      Nas Coats MD  10/19/19  1:34 PM         blood pressure low

## 2020-04-14 NOTE — ED ADULT NURSE NOTE - NSSISCREENINGQ3_ED_A_ED
Patient last had Prolia injection on 11/26/2019. We will call closer to 5/26/2020 to decide if Prolia injection can be scheduled or should be postponed due to Covid-19 per provider.   Please notify we will contact them to schedule.    Beatriz Espinal RN   No

## 2020-08-26 ENCOUNTER — APPOINTMENT (OUTPATIENT)
Dept: ORTHOPEDIC SURGERY | Facility: CLINIC | Age: 80
End: 2020-08-26
Payer: MEDICARE

## 2020-08-26 VITALS — RESPIRATION RATE: 16 BRPM | BODY MASS INDEX: 28 KG/M2 | HEIGHT: 63 IN | WEIGHT: 158 LBS

## 2020-08-26 DIAGNOSIS — M18.11 UNILATERAL PRIMARY OSTEOARTHRITIS OF FIRST CARPOMETACARPAL JOINT, RIGHT HAND: ICD-10-CM

## 2020-08-26 DIAGNOSIS — M18.12 UNILATERAL PRIMARY OSTEOARTHRITIS OF FIRST CARPOMETACARPAL JOINT, LEFT HAND: ICD-10-CM

## 2020-08-26 PROCEDURE — 73110 X-RAY EXAM OF WRIST: CPT | Mod: 50

## 2020-08-26 PROCEDURE — 99214 OFFICE O/P EST MOD 30 MIN: CPT

## 2020-10-23 ENCOUNTER — OUTPATIENT (OUTPATIENT)
Dept: OUTPATIENT SERVICES | Facility: HOSPITAL | Age: 80
LOS: 1 days | End: 2020-10-23

## 2020-10-23 ENCOUNTER — APPOINTMENT (OUTPATIENT)
Dept: RADIOLOGY | Facility: HOSPITAL | Age: 80
End: 2020-10-23

## 2020-10-23 ENCOUNTER — APPOINTMENT (OUTPATIENT)
Dept: RADIOLOGY | Facility: HOSPITAL | Age: 80
End: 2020-10-23
Payer: MEDICARE

## 2020-10-23 DIAGNOSIS — Z95.1 PRESENCE OF AORTOCORONARY BYPASS GRAFT: Chronic | ICD-10-CM

## 2020-10-23 DIAGNOSIS — Z98.890 OTHER SPECIFIED POSTPROCEDURAL STATES: Chronic | ICD-10-CM

## 2020-10-23 DIAGNOSIS — K44.9 DIAPHRAGMATIC HERNIA WITHOUT OBSTRUCTION OR GANGRENE: ICD-10-CM

## 2020-10-23 PROCEDURE — 74240 X-RAY XM UPR GI TRC 1CNTRST: CPT | Mod: 26

## 2021-08-24 ENCOUNTER — APPOINTMENT (OUTPATIENT)
Dept: CT IMAGING | Facility: CLINIC | Age: 81
End: 2021-08-24
Payer: MEDICARE

## 2021-08-24 ENCOUNTER — OUTPATIENT (OUTPATIENT)
Dept: OUTPATIENT SERVICES | Facility: HOSPITAL | Age: 81
LOS: 1 days | End: 2021-08-24
Payer: MEDICARE

## 2021-08-24 DIAGNOSIS — Z98.890 OTHER SPECIFIED POSTPROCEDURAL STATES: Chronic | ICD-10-CM

## 2021-08-24 DIAGNOSIS — Z00.8 ENCOUNTER FOR OTHER GENERAL EXAMINATION: ICD-10-CM

## 2021-08-24 DIAGNOSIS — Z95.1 PRESENCE OF AORTOCORONARY BYPASS GRAFT: Chronic | ICD-10-CM

## 2021-08-24 PROCEDURE — 72131 CT LUMBAR SPINE W/O DYE: CPT | Mod: 26,MH

## 2021-08-24 PROCEDURE — 72128 CT CHEST SPINE W/O DYE: CPT | Mod: 26,MH

## 2021-08-24 PROCEDURE — 76376 3D RENDER W/INTRP POSTPROCES: CPT

## 2021-08-24 PROCEDURE — 72131 CT LUMBAR SPINE W/O DYE: CPT | Mod: MH

## 2021-08-24 PROCEDURE — 72128 CT CHEST SPINE W/O DYE: CPT | Mod: MH

## 2021-08-24 PROCEDURE — 76376 3D RENDER W/INTRP POSTPROCES: CPT | Mod: 26

## 2021-08-25 ENCOUNTER — TRANSCRIPTION ENCOUNTER (OUTPATIENT)
Age: 81
End: 2021-08-25

## 2021-09-30 ENCOUNTER — TRANSCRIPTION ENCOUNTER (OUTPATIENT)
Age: 81
End: 2021-09-30

## 2021-10-05 ENCOUNTER — APPOINTMENT (OUTPATIENT)
Dept: DISASTER EMERGENCY | Facility: CLINIC | Age: 81
End: 2021-10-05

## 2021-10-05 DIAGNOSIS — Z01.818 ENCOUNTER FOR OTHER PREPROCEDURAL EXAMINATION: ICD-10-CM

## 2021-10-06 LAB — SARS-COV-2 N GENE NPH QL NAA+PROBE: NOT DETECTED

## 2021-10-29 ENCOUNTER — APPOINTMENT (OUTPATIENT)
Dept: ULTRASOUND IMAGING | Facility: CLINIC | Age: 81
End: 2021-10-29
Payer: MEDICARE

## 2021-10-29 PROCEDURE — 76536 US EXAM OF HEAD AND NECK: CPT

## 2021-12-10 DIAGNOSIS — R92.2 INCONCLUSIVE MAMMOGRAM: ICD-10-CM

## 2021-12-10 DIAGNOSIS — Z12.39 ENCOUNTER FOR OTHER SCREENING FOR MALIGNANT NEOPLASM OF BREAST: ICD-10-CM

## 2022-03-24 ENCOUNTER — APPOINTMENT (OUTPATIENT)
Dept: OBGYN | Facility: CLINIC | Age: 82
End: 2022-03-24
Payer: MEDICARE

## 2022-03-24 ENCOUNTER — ASOB RESULT (OUTPATIENT)
Age: 82
End: 2022-03-24

## 2022-03-24 VITALS
BODY MASS INDEX: 29.81 KG/M2 | DIASTOLIC BLOOD PRESSURE: 73 MMHG | WEIGHT: 162 LBS | SYSTOLIC BLOOD PRESSURE: 125 MMHG | HEIGHT: 62 IN

## 2022-03-24 DIAGNOSIS — N90.5 ATROPHY OF VULVA: ICD-10-CM

## 2022-03-24 DIAGNOSIS — N81.11 CYSTOCELE, MIDLINE: ICD-10-CM

## 2022-03-24 DIAGNOSIS — M81.0 AGE-RELATED OSTEOPOROSIS W/OUT CURRENT PATHOLOGICAL FRACTURE: ICD-10-CM

## 2022-03-24 PROCEDURE — 76830 TRANSVAGINAL US NON-OB: CPT

## 2022-03-24 PROCEDURE — G0328 FECAL BLOOD SCRN IMMUNOASSAY: CPT | Mod: GA,QW

## 2022-03-24 PROCEDURE — G0101: CPT | Mod: GA

## 2022-03-31 ENCOUNTER — OUTPATIENT (OUTPATIENT)
Dept: OUTPATIENT SERVICES | Facility: HOSPITAL | Age: 82
LOS: 1 days | Discharge: HOME | End: 2022-03-31

## 2022-03-31 DIAGNOSIS — Z95.1 PRESENCE OF AORTOCORONARY BYPASS GRAFT: Chronic | ICD-10-CM

## 2022-03-31 DIAGNOSIS — Z98.890 OTHER SPECIFIED POSTPROCEDURAL STATES: Chronic | ICD-10-CM

## 2022-03-31 DIAGNOSIS — D69.6 THROMBOCYTOPENIA, UNSPECIFIED: ICD-10-CM

## 2022-04-04 ENCOUNTER — RESULT REVIEW (OUTPATIENT)
Age: 82
End: 2022-04-04

## 2022-04-04 ENCOUNTER — APPOINTMENT (OUTPATIENT)
Dept: HEMATOLOGY ONCOLOGY | Facility: CLINIC | Age: 82
End: 2022-04-04
Payer: MEDICARE

## 2022-04-04 VITALS
SYSTOLIC BLOOD PRESSURE: 130 MMHG | TEMPERATURE: 97.2 F | HEIGHT: 61.3 IN | BODY MASS INDEX: 30.82 KG/M2 | WEIGHT: 165.35 LBS | DIASTOLIC BLOOD PRESSURE: 85 MMHG | HEART RATE: 60 BPM | OXYGEN SATURATION: 99 % | RESPIRATION RATE: 20 BRPM

## 2022-04-04 LAB
BASOPHILS # BLD AUTO: 0.03 K/UL — SIGNIFICANT CHANGE UP (ref 0–0.2)
BASOPHILS NFR BLD AUTO: 0.5 % — SIGNIFICANT CHANGE UP (ref 0–2)
EOSINOPHIL # BLD AUTO: 0.11 K/UL — SIGNIFICANT CHANGE UP (ref 0–0.5)
EOSINOPHIL NFR BLD AUTO: 1.9 % — SIGNIFICANT CHANGE UP (ref 0–6)
HCT VFR BLD CALC: 42 % — SIGNIFICANT CHANGE UP (ref 34.5–45)
HGB BLD-MCNC: 14.4 G/DL — SIGNIFICANT CHANGE UP (ref 11.5–15.5)
IMM GRANULOCYTES NFR BLD AUTO: 0.5 % — SIGNIFICANT CHANGE UP (ref 0–1.5)
LYMPHOCYTES # BLD AUTO: 0.68 K/UL — LOW (ref 1–3.3)
LYMPHOCYTES # BLD AUTO: 11.9 % — LOW (ref 13–44)
MCHC RBC-ENTMCNC: 32.2 PG — SIGNIFICANT CHANGE UP (ref 27–34)
MCHC RBC-ENTMCNC: 34.3 G/DL — SIGNIFICANT CHANGE UP (ref 32–36)
MCV RBC AUTO: 94 FL — SIGNIFICANT CHANGE UP (ref 80–100)
MONOCYTES # BLD AUTO: 0.54 K/UL — SIGNIFICANT CHANGE UP (ref 0–0.9)
MONOCYTES NFR BLD AUTO: 9.4 % — SIGNIFICANT CHANGE UP (ref 2–14)
NEUTROPHILS # BLD AUTO: 4.34 K/UL — SIGNIFICANT CHANGE UP (ref 1.8–7.4)
NEUTROPHILS NFR BLD AUTO: 75.8 % — SIGNIFICANT CHANGE UP (ref 43–77)
NRBC # BLD: 0 /100 WBCS — SIGNIFICANT CHANGE UP (ref 0–0)
PLATELET # BLD AUTO: 87 K/UL — LOW (ref 150–400)
RBC # BLD: 4.47 M/UL — SIGNIFICANT CHANGE UP (ref 3.8–5.2)
RBC # FLD: 13.7 % — SIGNIFICANT CHANGE UP (ref 10.3–14.5)
WBC # BLD: 5.73 K/UL — SIGNIFICANT CHANGE UP (ref 3.8–10.5)
WBC # FLD AUTO: 5.73 K/UL — SIGNIFICANT CHANGE UP (ref 3.8–10.5)

## 2022-04-04 PROCEDURE — 99205 OFFICE O/P NEW HI 60 MIN: CPT

## 2022-04-04 RX ORDER — FLUTICASONE PROPIONATE AND SALMETEROL 250; 50 UG/1; UG/1
250-50 POWDER RESPIRATORY (INHALATION)
Refills: 0 | Status: ACTIVE | COMMUNITY

## 2022-04-04 RX ORDER — FAMOTIDINE 20 MG/1
20 TABLET, FILM COATED ORAL
Refills: 0 | Status: ACTIVE | COMMUNITY

## 2022-04-04 RX ORDER — LABETALOL HYDROCHLORIDE 100 MG/1
100 TABLET, FILM COATED ORAL
Refills: 0 | Status: DISCONTINUED | COMMUNITY
Start: 2019-09-05 | End: 2022-04-04

## 2022-04-04 RX ORDER — POTASSIUM CHLORIDE 1500 MG/1
20 TABLET, FILM COATED, EXTENDED RELEASE ORAL DAILY
Qty: 60 | Refills: 0 | Status: ACTIVE | COMMUNITY
Start: 2022-04-04 | End: 1900-01-01

## 2022-04-04 RX ORDER — ASPIRIN 81 MG/1
81 TABLET ORAL
Qty: 30 | Refills: 0 | Status: ACTIVE | COMMUNITY
Start: 2022-04-04 | End: 1900-01-01

## 2022-04-08 NOTE — ASSESSMENT
[FreeTextEntry1] : 81 y/o F with extensive medical history including CAD s/p PCI x 2 and CABG x 4 (2013) c/b paroxysmal atrial fibrillation on Xarelto, aortic stenosis s/p TAVR in December 2020, bradycardia s/p PPM, COPD, osteoporosis on Prolia q 6 months c/b vertebral fractures, hiatal hernia c/b UGIB s/p corrective surgery in 2008 and here for establishment of care for long-standing history of thrombocytopenia.\par \par - On review of previous labs, patient has had a consistent thrombocytopenia over decades in the range of the 110s-120s. \par - Discussed the differential diagnosis with the patient. She has a history of positive anticardiolipin antibody and has family history of APLS, however she does not qualify for this diagnosis (no history of miscarriage, no history of thrombosis). However, is it possible that her thrombocytopenia may be related to this antibody with an ITP type physiology. Regardless, up to this point her platelet count has been relatively stable and no intervention is needed. \par - Platelet count on today's evaluation in the office was 87K. This is lower than typical for her. Given her age and the fact that she is on anticoagulation and antiplatelet agent will follow up in 3 months for repeat platelet count. In general favor bone marrow evaluation with consistent platelet count below 100k in patient of her age group given the likelihood of MDS, but this is one spurious level at this point. She understands this, and if counts consistently dropping in the future will need to consider. \par -Patient understands and agrees with plan. All information explained to the best of my ability.

## 2022-06-23 ENCOUNTER — OUTPATIENT (OUTPATIENT)
Dept: OUTPATIENT SERVICES | Facility: HOSPITAL | Age: 82
LOS: 1 days | Discharge: HOME | End: 2022-06-23

## 2022-06-23 DIAGNOSIS — Z95.1 PRESENCE OF AORTOCORONARY BYPASS GRAFT: Chronic | ICD-10-CM

## 2022-06-23 DIAGNOSIS — Z98.890 OTHER SPECIFIED POSTPROCEDURAL STATES: Chronic | ICD-10-CM

## 2022-06-23 DIAGNOSIS — D69.6 THROMBOCYTOPENIA, UNSPECIFIED: ICD-10-CM

## 2022-06-27 ENCOUNTER — APPOINTMENT (OUTPATIENT)
Dept: OBGYN | Facility: CLINIC | Age: 82
End: 2022-06-27

## 2022-06-29 ENCOUNTER — LABORATORY RESULT (OUTPATIENT)
Age: 82
End: 2022-06-29

## 2022-06-29 ENCOUNTER — APPOINTMENT (OUTPATIENT)
Dept: HEMATOLOGY ONCOLOGY | Facility: CLINIC | Age: 82
End: 2022-06-29

## 2022-06-29 ENCOUNTER — RESULT REVIEW (OUTPATIENT)
Age: 82
End: 2022-06-29

## 2022-06-29 LAB
BASOPHILS # BLD AUTO: 0.01 K/UL — SIGNIFICANT CHANGE UP (ref 0–0.2)
BASOPHILS NFR BLD AUTO: 0.3 % — SIGNIFICANT CHANGE UP (ref 0–2)
EOSINOPHIL # BLD AUTO: 0.1 K/UL — SIGNIFICANT CHANGE UP (ref 0–0.5)
EOSINOPHIL NFR BLD AUTO: 2.6 % — SIGNIFICANT CHANGE UP (ref 0–6)
ERYTHROCYTE [SEDIMENTATION RATE] IN BLOOD: 17 MM/HR — SIGNIFICANT CHANGE UP (ref 0–20)
HCT VFR BLD CALC: 39.1 % — SIGNIFICANT CHANGE UP (ref 34.5–45)
HGB BLD-MCNC: 12.8 G/DL — SIGNIFICANT CHANGE UP (ref 11.5–15.5)
IMM GRANULOCYTES NFR BLD AUTO: 0.3 % — SIGNIFICANT CHANGE UP (ref 0–1.5)
LYMPHOCYTES # BLD AUTO: 0.76 K/UL — LOW (ref 1–3.3)
LYMPHOCYTES # BLD AUTO: 19.7 % — SIGNIFICANT CHANGE UP (ref 13–44)
MCHC RBC-ENTMCNC: 31.9 PG — SIGNIFICANT CHANGE UP (ref 27–34)
MCHC RBC-ENTMCNC: 32.7 G/DL — SIGNIFICANT CHANGE UP (ref 32–36)
MCV RBC AUTO: 97.5 FL — SIGNIFICANT CHANGE UP (ref 80–100)
MONOCYTES # BLD AUTO: 0.41 K/UL — SIGNIFICANT CHANGE UP (ref 0–0.9)
MONOCYTES NFR BLD AUTO: 10.6 % — SIGNIFICANT CHANGE UP (ref 2–14)
NEUTROPHILS # BLD AUTO: 2.57 K/UL — SIGNIFICANT CHANGE UP (ref 1.8–7.4)
NEUTROPHILS NFR BLD AUTO: 66.5 % — SIGNIFICANT CHANGE UP (ref 43–77)
NRBC # BLD: 0 /100 WBCS — SIGNIFICANT CHANGE UP (ref 0–0)
PLATELET # BLD AUTO: 81 K/UL — LOW (ref 150–400)
RBC # BLD: 4.01 M/UL — SIGNIFICANT CHANGE UP (ref 3.8–5.2)
RBC # FLD: 14.9 % — HIGH (ref 10.3–14.5)
WBC # BLD: 3.86 K/UL — SIGNIFICANT CHANGE UP (ref 3.8–10.5)
WBC # FLD AUTO: 3.86 K/UL — SIGNIFICANT CHANGE UP (ref 3.8–10.5)

## 2022-07-05 ENCOUNTER — APPOINTMENT (OUTPATIENT)
Dept: PULMONOLOGY | Facility: CLINIC | Age: 82
End: 2022-07-05

## 2022-07-05 VITALS
HEART RATE: 82 BPM | HEIGHT: 61 IN | OXYGEN SATURATION: 93 % | DIASTOLIC BLOOD PRESSURE: 59 MMHG | BODY MASS INDEX: 30.58 KG/M2 | WEIGHT: 162 LBS | TEMPERATURE: 97.3 F | SYSTOLIC BLOOD PRESSURE: 97 MMHG

## 2022-07-05 DIAGNOSIS — J44.9 CHRONIC OBSTRUCTIVE PULMONARY DISEASE, UNSPECIFIED: ICD-10-CM

## 2022-07-05 DIAGNOSIS — I25.10 ATHEROSCLEROTIC HEART DISEASE OF NATIVE CORONARY ARTERY W/OUT ANGINA PECTORIS: ICD-10-CM

## 2022-07-05 DIAGNOSIS — I10 ESSENTIAL (PRIMARY) HYPERTENSION: ICD-10-CM

## 2022-07-05 DIAGNOSIS — R05.8 OTHER SPECIFIED COUGH: ICD-10-CM

## 2022-07-05 PROCEDURE — 99203 OFFICE O/P NEW LOW 30 MIN: CPT

## 2022-07-05 RX ORDER — FLUTICASONE PROPIONATE 50 UG/1
50 SPRAY, METERED NASAL TWICE DAILY
Qty: 1 | Refills: 3 | Status: ACTIVE | COMMUNITY
Start: 2022-07-05 | End: 1900-01-01

## 2022-07-05 NOTE — REVIEW OF SYSTEMS
[Fatigue] : fatigue [Sinus Problems] : sinus problems [Cough] : cough [Dyspnea] : dyspnea [GERD] : gerd [TextBox_44] : as in history of present illness

## 2022-07-05 NOTE — HISTORY OF PRESENT ILLNESS
[TextBox_4] : 82 year old female with a cough and shortness of breath. The patient has an extensive cardiac history having undergone cardiac bypass surgery,  transaortic valvular replacement, pacemaker insertion and is to undergo a patient with atrial fibrillation. The patient had a recent chest x-ray which showed congestion and bilateral pleural effusions and was treated with a dose of furosemide. She is just on spironolactone. Cough improved but did not disappear . The cough was initially worse at night but now is associated with frequent throat clearing.. Almost all of her cardiac care has recently occurred at [facility] and so we do not have recent data that her echocardiogram from 2016 showed significantly impaired cardiac function with biatrial enlargement. She has hypertension and at that time had ventricular hypertrophy. The patient is a former significant smoker who has been under the care of Dr. Birch who has her on [employer]

## 2022-07-05 NOTE — ASSESSMENT
[FreeTextEntry1] : I reviewed her chest radiograph and cardiac testing from [facility] but did not have [facility] data. The cough clearly is in part related to her heart failure as is her dyspnea. She does have a component of upper airway cough syndrome for which I prescribed nasal fluticasone. Currently her fluid balance looks satisfactory after treatment with furosemide. She is to undergo oblation and for that we'll have to be on amiodarone as well as Multaq. I explained to her that her major issue was cardiac and pulmonary issues actually paled in comparison.\par Follow up as necessary

## 2022-07-05 NOTE — REASON FOR VISIT
[Initial] : an initial visit [Cough] : cough [Shortness of Breath] : shortness of breath [Spouse] : spouse

## 2022-07-05 NOTE — PHYSICAL EXAM
[No Acute Distress] : no acute distress [Normal Appearance] : normal appearance [No Resp Distress] : no resp distress [Clear to Auscultation Bilaterally] : clear to auscultation bilaterally [No Edema] : no edema [TextBox_54] : irregular rhythm [TextBox_99] : uses walker

## 2022-07-07 ENCOUNTER — APPOINTMENT (OUTPATIENT)
Dept: HEMATOLOGY ONCOLOGY | Facility: CLINIC | Age: 82
End: 2022-07-07

## 2022-07-07 VITALS
TEMPERATURE: 98.1 F | BODY MASS INDEX: 29.81 KG/M2 | OXYGEN SATURATION: 96 % | DIASTOLIC BLOOD PRESSURE: 56 MMHG | HEART RATE: 77 BPM | WEIGHT: 162 LBS | HEIGHT: 62 IN | RESPIRATION RATE: 17 BRPM | SYSTOLIC BLOOD PRESSURE: 92 MMHG

## 2022-07-07 DIAGNOSIS — I48.0 PAROXYSMAL ATRIAL FIBRILLATION: ICD-10-CM

## 2022-07-07 PROCEDURE — 99214 OFFICE O/P EST MOD 30 MIN: CPT

## 2022-07-17 ENCOUNTER — NON-APPOINTMENT (OUTPATIENT)
Age: 82
End: 2022-07-17

## 2022-07-18 ENCOUNTER — TRANSCRIPTION ENCOUNTER (OUTPATIENT)
Age: 82
End: 2022-07-18

## 2022-07-21 ENCOUNTER — APPOINTMENT (OUTPATIENT)
Dept: OBGYN | Facility: CLINIC | Age: 82
End: 2022-07-21

## 2022-08-04 ENCOUNTER — APPOINTMENT (OUTPATIENT)
Dept: OBGYN | Facility: CLINIC | Age: 82
End: 2022-08-04

## 2022-08-04 PROCEDURE — 77080 DXA BONE DENSITY AXIAL: CPT

## 2022-08-19 ENCOUNTER — APPOINTMENT (OUTPATIENT)
Dept: OBGYN | Facility: CLINIC | Age: 82
End: 2022-08-19

## 2022-08-19 NOTE — PHYSICAL EXAM
[Restricted in physically strenuous activity but ambulatory and able to carry out work of a light or sedentary nature] : Status 1- Restricted in physically strenuous activity but ambulatory and able to carry out work of a light or sedentary nature, e.g., light house work, office work [Normal] : affect appropriate [de-identified] : supple [de-identified] : (+)S1S2 irregular rhythm rate controlled [de-identified] : no edema [de-identified] : no pain on palpation [de-identified] : limited ROM in LE [de-identified] : warm/dry

## 2022-08-19 NOTE — HISTORY OF PRESENT ILLNESS
[de-identified] : 81 y/o F with history CAD s/p PCI x 2 and CABG x 4 (2013) c/b paroxysmal atrial fibrillation on Xarelto, aortic stenosis s/p TAVR in December 2020, s/p PPM due to Bradycardia 6 months prior to initial visit, also with COPD, also with multiple compression fractures in back after hip replacement in 2019, also with osteoporosis on Prolia q 6 months (previously on Reclast), also with history of hiatal hernia s/p corrective surgery in 2008 (had been complicated prior to this surgery with very severe UGIB - resolved since she had that surgery), here for evaluation of thrombocytopenia. She has had low platelet levels since probably around 30 years ago. She is not aware of the lowest level, but she thinks it has always been over 100k. Other than her upper GI bleed related to the hiatal hernia she has not had any other bleeds. She reported that she has recently increased her sertraline as she feels like she is going into a depression. She feels the increased dose has helped a lot. Normal appetite, no night sweats. She has been trying to lose weight and at this point she has lost 25 pounds over a long period of time. Normal bowel movements, normal urinary habits. No new skin rashes. No nausea or vomiting, no diarrhea. 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 Of note, she has two daughters who are diagnosed with APLS.  [de-identified] : Since last visit the patient has been found to have A-fib and is currently scheduled for DANE with cardioversion and possible ablation at [facility] on 7/19/22. She states she was having palpitations and shortness of breath when this occurred. Her cardiologist had changed some of her medications since and she is not having these symptoms any longer. She continues ASA and Xarelto. Today her platelets are 81K, last evaluated in April and were 87K. She denies any new bruising or bleeding as well as any blood in urine and stool. She continues to have lower back pain and spine pain which has been going on since her hip replacement in 2019.

## 2022-08-19 NOTE — REVIEW OF SYSTEMS
[Fatigue] : fatigue [Palpitations] : palpitations [Shortness Of Breath] : shortness of breath [Joint Pain] : joint pain [Joint Stiffness] : joint stiffness [Difficulty Walking] : difficulty walking [Negative] : Heme/Lymph [Fever] : no fever [Chills] : no chills [Night Sweats] : no night sweats [Recent Change In Weight] : ~T no recent weight change [Vision Problems] : no vision problems [Dysphagia] : no dysphagia [Nosebleeds] : no nosebleeds [Odynophagia] : no odynophagia [Chest Pain] : no chest pain [Lower Ext Edema] : no lower extremity edema [Wheezing] : no wheezing [Cough] : no cough [SOB on Exertion] : no shortness of breath during exertion [Dysuria] : no dysuria [Incontinence] : no incontinence [Muscle Pain] : no muscle pain [Muscle Weakness] : no muscle weakness [Confused] : no confusion [Dizziness] : no dizziness [Fainting] : no fainting [Insomnia] : no insomnia [Hot Flashes] : no hot flashes [FreeTextEntry5] : per HPI none at this time [FreeTextEntry6] : per HPI none at this time [FreeTextEntry9] : chronic pain in spine and hip [de-identified] : due to hip pain

## 2022-08-19 NOTE — ASSESSMENT
[FreeTextEntry1] : 83 y/o F with extensive medical history including CAD s/p PCI x 2 and CABG x 4 (2013) c/b paroxysmal atrial fibrillation on Xarelto, aortic stenosis s/p TAVR in December 2020, bradycardia s/p PPM, COPD, osteoporosis on Prolia q 6 months c/b vertebral fractures, hiatal hernia c/b UGIB s/p corrective surgery in 2008 here for follow up of long-standing history of thrombocytopenia.\par \par - On review of previous labs, patient has had a consistent thrombocytopenia over decades in the range of the 110s-120s. \par - Differential diagnosis was previously discussed at initial consultation with the patient. She has a history of positive anticardiolipin antibody and has family history of APLS, however she does not qualify for this diagnosis (no history of miscarriage, no history of thrombosis). However, is it possible that her thrombocytopenia may be related to this antibody with an ITP type physiology. Regardless, up to this point her platelet count has been relatively stable and no intervention is needed. \par - Platelet count on today's evaluation in the office was 81K. This is lower than typical for her in the past however when evaluated in April she was 87K. Given her age and the fact that she is on anticoagulation and antiplatelet agent will follow up again in 3 months for repeat platelet count. In general favor bone marrow evaluation with consistent platelet count below 100k in patient of her age group given the likelihood of MDS, but will defer at this time. She understands this, and if counts consistently dropping in the future will need to consider. \par -Continue follow up with Cardiology, currently planned for DANE and Cardioversion on 7/19/22 for A-fib.\par -Patient understands and agrees with plan. All information explained to the best of my ability.\par \par RTC in 3 months

## 2022-08-25 ENCOUNTER — APPOINTMENT (OUTPATIENT)
Dept: OBGYN | Facility: CLINIC | Age: 82
End: 2022-08-25

## 2022-09-13 DIAGNOSIS — E04.1 NONTOXIC SINGLE THYROID NODULE: ICD-10-CM

## 2022-09-15 NOTE — ED PROVIDER NOTE - DIAGNOSIS COUNSELING, MDM
Impression: Puckering of macula, left eye: [id number]. Plan: Will continue to observe condition and or symptoms. conducted a detailed discussion... I had a detailed discussion with the patient and/or guardian regarding the historical points, exam findings, and any diagnostic results supporting the discharge/admit diagnosis.

## 2022-09-29 ENCOUNTER — OUTPATIENT (OUTPATIENT)
Dept: OUTPATIENT SERVICES | Facility: HOSPITAL | Age: 82
LOS: 1 days | Discharge: HOME | End: 2022-09-29

## 2022-09-29 DIAGNOSIS — Z98.890 OTHER SPECIFIED POSTPROCEDURAL STATES: Chronic | ICD-10-CM

## 2022-09-29 DIAGNOSIS — D69.6 THROMBOCYTOPENIA, UNSPECIFIED: ICD-10-CM

## 2022-09-29 DIAGNOSIS — Z95.1 PRESENCE OF AORTOCORONARY BYPASS GRAFT: Chronic | ICD-10-CM

## 2022-10-06 ENCOUNTER — APPOINTMENT (OUTPATIENT)
Dept: PULMONOLOGY | Facility: CLINIC | Age: 82
End: 2022-10-06

## 2022-10-06 ENCOUNTER — APPOINTMENT (OUTPATIENT)
Dept: HEMATOLOGY ONCOLOGY | Facility: CLINIC | Age: 82
End: 2022-10-06

## 2022-10-18 ENCOUNTER — APPOINTMENT (OUTPATIENT)
Dept: HEMATOLOGY ONCOLOGY | Facility: CLINIC | Age: 82
End: 2022-10-18

## 2022-10-18 ENCOUNTER — NON-APPOINTMENT (OUTPATIENT)
Age: 82
End: 2022-10-18

## 2022-10-18 NOTE — REVIEW OF SYSTEMS
[Fever] : no fever [Chills] : no chills [Night Sweats] : no night sweats [Recent Change In Weight] : ~T no recent weight change [Vision Problems] : no vision problems [Dysphagia] : no dysphagia [Nosebleeds] : no nosebleeds [Odynophagia] : no odynophagia [Chest Pain] : no chest pain [Lower Ext Edema] : no lower extremity edema [Wheezing] : no wheezing [Cough] : no cough [SOB on Exertion] : no shortness of breath during exertion [Dysuria] : no dysuria [Incontinence] : no incontinence [Muscle Pain] : no muscle pain [Muscle Weakness] : no muscle weakness [Confused] : no confusion [Dizziness] : no dizziness [Fainting] : no fainting [Insomnia] : no insomnia [Hot Flashes] : no hot flashes [FreeTextEntry5] : per HPI none at this time [FreeTextEntry9] : chronic pain in spine and hip [FreeTextEntry6] : per HPI none at this time [de-identified] : due to hip pain

## 2022-10-18 NOTE — PHYSICAL EXAM
[de-identified] : supple [de-identified] : (+)S1S2 irregular rhythm rate controlled [de-identified] : no edema [de-identified] : no pain on palpation [de-identified] : limited ROM in LE [de-identified] : warm/dry

## 2022-10-18 NOTE — HISTORY OF PRESENT ILLNESS
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 She has had low platelet levels since probably around 30 years ago. She is not aware of the lowest level, but she thinks it has always been over 100k. Other than her upper GI bleed related to the hiatal hernia she has not had any other bleeds. She reported that she has recently increased her sertraline as she feels like she is going into a depression. She feels the increased dose has helped a lot. Normal appetite, no night sweats. She has been trying to lose weight and at this point she has lost 25 pounds over a long period of time. Normal bowel movements, normal urinary habits. No new skin rashes. No nausea or vomiting, no diarrhea. 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 Of note, she has two daughters who are diagnosed with APLS. \par \par Patient was diagnosed with afib, and planned for DANE with cardioversion and possible ablation at [facility] on 7/19/22. She states she was having palpitations and shortness of breath when this occurred. Her cardiologist had changed some of her medications since and she is not having these symptoms any longer. She continues ASA and Xarelto. Today her platelets are 81K, last evaluated in April and were 87K. She denies any new bruising or bleeding as well as any blood in urine and stool. She continues to have lower back pain and spine pain which has been going on since her hip replacement in 2019.  [de-identified] : 10/17/22: CHECK CBC!!

## 2022-10-25 ENCOUNTER — RESULT REVIEW (OUTPATIENT)
Age: 82
End: 2022-10-25

## 2022-10-25 ENCOUNTER — APPOINTMENT (OUTPATIENT)
Dept: HEMATOLOGY ONCOLOGY | Facility: CLINIC | Age: 82
End: 2022-10-25

## 2022-10-25 VITALS
WEIGHT: 157.98 LBS | OXYGEN SATURATION: 95 % | TEMPERATURE: 97 F | HEART RATE: 60 BPM | DIASTOLIC BLOOD PRESSURE: 68 MMHG | BODY MASS INDEX: 28.9 KG/M2 | RESPIRATION RATE: 15 BRPM | SYSTOLIC BLOOD PRESSURE: 115 MMHG

## 2022-10-25 LAB
BASOPHILS # BLD AUTO: 0.05 K/UL — SIGNIFICANT CHANGE UP (ref 0–0.2)
BASOPHILS NFR BLD AUTO: 0.8 % — SIGNIFICANT CHANGE UP (ref 0–2)
EOSINOPHIL # BLD AUTO: 0.19 K/UL — SIGNIFICANT CHANGE UP (ref 0–0.5)
EOSINOPHIL NFR BLD AUTO: 3.2 % — SIGNIFICANT CHANGE UP (ref 0–6)
HCT VFR BLD CALC: 38.8 % — SIGNIFICANT CHANGE UP (ref 34.5–45)
HGB BLD-MCNC: 13 G/DL — SIGNIFICANT CHANGE UP (ref 11.5–15.5)
IMM GRANULOCYTES NFR BLD AUTO: 0.7 % — SIGNIFICANT CHANGE UP (ref 0–0.9)
LYMPHOCYTES # BLD AUTO: 1.02 K/UL — SIGNIFICANT CHANGE UP (ref 1–3.3)
LYMPHOCYTES # BLD AUTO: 17.1 % — SIGNIFICANT CHANGE UP (ref 13–44)
MCHC RBC-ENTMCNC: 31.9 PG — SIGNIFICANT CHANGE UP (ref 27–34)
MCHC RBC-ENTMCNC: 33.5 G/DL — SIGNIFICANT CHANGE UP (ref 32–36)
MCV RBC AUTO: 95.1 FL — SIGNIFICANT CHANGE UP (ref 80–100)
MONOCYTES # BLD AUTO: 0.68 K/UL — SIGNIFICANT CHANGE UP (ref 0–0.9)
MONOCYTES NFR BLD AUTO: 11.4 % — SIGNIFICANT CHANGE UP (ref 2–14)
NEUTROPHILS # BLD AUTO: 3.98 K/UL — SIGNIFICANT CHANGE UP (ref 1.8–7.4)
NEUTROPHILS NFR BLD AUTO: 66.8 % — SIGNIFICANT CHANGE UP (ref 43–77)
NRBC # BLD: 0 /100 WBCS — SIGNIFICANT CHANGE UP (ref 0–0)
PLATELET # BLD AUTO: 115 K/UL — LOW (ref 150–400)
RBC # BLD: 4.08 M/UL — SIGNIFICANT CHANGE UP (ref 3.8–5.2)
RBC # FLD: 14.6 % — HIGH (ref 10.3–14.5)
WBC # BLD: 5.96 K/UL — SIGNIFICANT CHANGE UP (ref 3.8–10.5)
WBC # FLD AUTO: 5.96 K/UL — SIGNIFICANT CHANGE UP (ref 3.8–10.5)

## 2022-10-25 PROCEDURE — 99214 OFFICE O/P EST MOD 30 MIN: CPT

## 2022-10-25 RX ORDER — PREDNISONE 5 MG/1
5 TABLET ORAL
Qty: 30 | Refills: 0 | Status: COMPLETED | COMMUNITY
Start: 2022-06-02

## 2022-10-25 RX ORDER — DOXAZOSIN 1 MG/1
1 TABLET ORAL
Qty: 30 | Refills: 0 | Status: DISCONTINUED | COMMUNITY
Start: 2022-05-04

## 2022-10-25 RX ORDER — FUROSEMIDE 20 MG/1
20 TABLET ORAL
Qty: 30 | Refills: 0 | Status: DISCONTINUED | COMMUNITY
Start: 2022-09-14

## 2022-10-25 RX ORDER — CLONIDINE 0.1 MG/24H
0.1 PATCH, EXTENDED RELEASE TRANSDERMAL
Qty: 4 | Refills: 0 | Status: DISCONTINUED | COMMUNITY
Start: 2022-04-15

## 2022-10-25 RX ORDER — FUROSEMIDE 40 MG/1
40 TABLET ORAL
Qty: 30 | Refills: 0 | Status: DISCONTINUED | COMMUNITY
Start: 2022-06-15

## 2022-10-25 RX ORDER — SPIRONOLACTONE 25 MG/1
25 TABLET ORAL
Qty: 45 | Refills: 0 | Status: ACTIVE | COMMUNITY
Start: 2022-04-28

## 2022-10-25 RX ORDER — ALBUTEROL SULFATE 90 UG/1
108 (90 BASE) INHALANT RESPIRATORY (INHALATION)
Qty: 8 | Refills: 0 | Status: ACTIVE | COMMUNITY
Start: 2022-05-05

## 2022-10-25 RX ORDER — AMOXICILLIN 500 MG/1
500 CAPSULE ORAL
Qty: 24 | Refills: 0 | Status: COMPLETED | COMMUNITY
Start: 2022-05-02

## 2022-10-25 RX ORDER — SERTRALINE 25 MG/1
25 TABLET, FILM COATED ORAL
Qty: 90 | Refills: 0 | Status: DISCONTINUED | COMMUNITY
Start: 2022-02-08

## 2022-10-25 RX ORDER — LOSARTAN POTASSIUM 50 MG/1
50 TABLET, FILM COATED ORAL
Qty: 90 | Refills: 0 | Status: ACTIVE | COMMUNITY
Start: 2022-08-18

## 2022-10-30 NOTE — PHYSICAL EXAM
[Restricted in physically strenuous activity but ambulatory and able to carry out work of a light or sedentary nature] : Status 1- Restricted in physically strenuous activity but ambulatory and able to carry out work of a light or sedentary nature, e.g., light house work, office work [Normal] : affect appropriate [de-identified] : supple [de-identified] : (+)S1S2 irregular rhythm rate controlled [de-identified] : no edema [de-identified] : no pain on palpation [de-identified] : limited ROM in LE [de-identified] : warm/dry

## 2022-10-30 NOTE — HISTORY OF PRESENT ILLNESS
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 She has had low platelet levels since probably around 30 years ago. She is not aware of the lowest level, but she thinks it has always been over 100k. Other than her upper GI bleed related to the hiatal hernia she has not had any other bleeds. She reported that she has recently increased her sertraline as she feels like she is going into a depression. She feels the increased dose has helped a lot. Normal appetite, no night sweats. She has been trying to lose weight and at this point she has lost 25 pounds over a long period of time. Normal bowel movements, normal urinary habits. No new skin rashes. No nausea or vomiting, no diarrhea. 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 Of note, she has two daughters who are diagnosed with APLS. \par \par Since initial visit the patient has been found to have A-fib and was scheduled for DANE with cardioversion and possible ablation at [place] on 7/19/22. She states she was having palpitations and shortness of breath when this occurred. In July 2022 her platelets are 81K, last evaluated in April 2022 and were 87K. [de-identified] : Patient seen for follow up on 10/25/2022. Patient is s/p cardioversion 4-5 weeks prior to this follow up and then ablation around 2.5 weeks prior to this follow up. She started on amiodarone after the ablation and reported in the office today that she felt a wave of fatigue today. She reports that she is more energetic than when she had afib, but she still gets tired very easily. She denied any bleeding, still has some bruising from the leads from the hospital but not bad and otherwise no other bruising. Still on Xarelto and aspirin 81 mg daily. She reported her appetite is not the same, but its alright. Weight is mostly stable, although decreased a little bit since early in the year. She has mild constipation from time to time but not severe or persistent. Urinating "too much" and she's not on heavy duty diuresis at this point.

## 2022-10-30 NOTE — ASSESSMENT
[FreeTextEntry1] : 81 y/o F with extensive medical history including CAD s/p PCI x 2 and CABG x 4 (2013) c/b paroxysmal atrial fibrillation on Xarelto, aortic stenosis s/p TAVR in December 2020, bradycardia s/p PPM, COPD, osteoporosis on Prolia q 6 months c/b vertebral fractures, hiatal hernia c/b UGIB s/p corrective surgery in 2008 here for follow up of long-standing history of thrombocytopenia.\par \par - On review of previous labs, patient has had a consistent thrombocytopenia over decades in the range of the 110s-120s. \par - Differential diagnosis was previously discussed at initial consultation with the patient. She has a history of positive anticardiolipin antibody and has family history of APLS, however she does not qualify for this diagnosis (no history of miscarriage, no history of thrombosis). However, is it possible that her thrombocytopenia may be related to this antibody with an ITP type physiology. Regardless, up to this point her platelet count has been relatively stable and no intervention is needed. \par -Had been downtrending recently, as low as 74K in the setting of her rapid atrial fibrillation prior to getting ablation.  Given her age and the fact that she is on anticoagulation and antiplatelet agent, in general favor bone marrow evaluation with consistent platelet count below 100k in patient of her age group given the likelihood of MDS, but deferred at that time due to the multiple other comorbidities. \par -Continue follow up with Cardiology, now s/p successful cardioversion - ablation which she reports she feels improved from. \par -Platelet count improved today to 115k. Possibly was suppressed by inflammation associated with her long standing rapid afib? Will follow up in short interval. \par -Patient understands and agrees with plan. All information explained to the best of my ability.\par RTC in 3 months

## 2022-10-30 NOTE — REVIEW OF SYSTEMS
[Fatigue] : fatigue [Palpitations] : palpitations [Shortness Of Breath] : shortness of breath [Joint Pain] : joint pain [Joint Stiffness] : joint stiffness [Difficulty Walking] : difficulty walking [Negative] : Heme/Lymph [Fever] : no fever [Chills] : no chills [Night Sweats] : no night sweats [Recent Change In Weight] : ~T no recent weight change [Vision Problems] : no vision problems [Dysphagia] : no dysphagia [Nosebleeds] : no nosebleeds [Odynophagia] : no odynophagia [Chest Pain] : no chest pain [Lower Ext Edema] : no lower extremity edema [Wheezing] : no wheezing [Cough] : no cough [SOB on Exertion] : no shortness of breath during exertion [Dysuria] : no dysuria [Incontinence] : no incontinence [Muscle Pain] : no muscle pain [Muscle Weakness] : no muscle weakness [Confused] : no confusion [Dizziness] : no dizziness [Fainting] : no fainting [Insomnia] : no insomnia [Hot Flashes] : no hot flashes [FreeTextEntry5] : per HPI none at this time [FreeTextEntry6] : per HPI none at this time [FreeTextEntry9] : chronic pain in spine and hip [de-identified] : due to hip pain

## 2023-04-07 ENCOUNTER — APPOINTMENT (OUTPATIENT)
Dept: ORTHOPEDIC SURGERY | Facility: CLINIC | Age: 83
End: 2023-04-07
Payer: MEDICARE

## 2023-04-07 VITALS — BODY MASS INDEX: 28.89 KG/M2 | HEIGHT: 62 IN | RESPIRATION RATE: 14 BRPM | WEIGHT: 157 LBS

## 2023-04-07 DIAGNOSIS — M18.0 BILATERAL PRIMARY OSTEOARTHRITIS OF FIRST CARPOMETACARPAL JOINTS: ICD-10-CM

## 2023-04-07 PROCEDURE — 99214 OFFICE O/P EST MOD 30 MIN: CPT | Mod: 25

## 2023-04-07 PROCEDURE — 20600 DRAIN/INJ JOINT/BURSA W/O US: CPT | Mod: FA

## 2023-04-07 PROCEDURE — 73110 X-RAY EXAM OF WRIST: CPT | Mod: 50

## 2023-04-07 RX ORDER — DICLOFENAC SODIUM 1% 10 MG/G
1 GEL TOPICAL DAILY
Qty: 1 | Refills: 3 | Status: ACTIVE | COMMUNITY
Start: 2023-04-07 | End: 1900-01-01

## 2023-04-07 RX ORDER — OXYCODONE AND ACETAMINOPHEN 5; 325 MG/1; MG/1
5-325 TABLET ORAL
Qty: 12 | Refills: 0 | Status: ACTIVE | COMMUNITY
Start: 2023-04-07 | End: 1900-01-01

## 2023-04-15 ENCOUNTER — OUTPATIENT (OUTPATIENT)
Dept: OUTPATIENT SERVICES | Facility: HOSPITAL | Age: 83
LOS: 1 days | Discharge: HOME | End: 2023-04-15

## 2023-04-15 DIAGNOSIS — Z95.1 PRESENCE OF AORTOCORONARY BYPASS GRAFT: Chronic | ICD-10-CM

## 2023-04-15 DIAGNOSIS — Z98.890 OTHER SPECIFIED POSTPROCEDURAL STATES: Chronic | ICD-10-CM

## 2023-04-15 DIAGNOSIS — D69.6 THROMBOCYTOPENIA, UNSPECIFIED: ICD-10-CM

## 2023-04-20 ENCOUNTER — APPOINTMENT (OUTPATIENT)
Dept: HEMATOLOGY ONCOLOGY | Facility: CLINIC | Age: 83
End: 2023-04-20

## 2023-04-26 DIAGNOSIS — Z12.39 ENCOUNTER FOR OTHER SCREENING FOR MALIGNANT NEOPLASM OF BREAST: ICD-10-CM

## 2023-05-01 ENCOUNTER — APPOINTMENT (OUTPATIENT)
Dept: HEMATOLOGY ONCOLOGY | Facility: CLINIC | Age: 83
End: 2023-05-01
Payer: MEDICARE

## 2023-05-01 PROCEDURE — 99213 OFFICE O/P EST LOW 20 MIN: CPT | Mod: 95

## 2023-05-01 RX ORDER — BETAXOLOL 10 MG/1
10 TABLET, FILM COATED ORAL TWICE DAILY
Refills: 0 | Status: DISCONTINUED | COMMUNITY
End: 2023-05-01

## 2023-05-01 RX ORDER — CHLORTHALIDONE 25 MG/1
25 TABLET ORAL TWICE DAILY
Refills: 0 | Status: DISCONTINUED | COMMUNITY
End: 2023-05-01

## 2023-05-01 RX ORDER — LEVOTHYROXINE SODIUM 0.1 MG/1
100 TABLET ORAL
Qty: 30 | Refills: 0 | Status: ACTIVE | COMMUNITY
Start: 2023-05-01

## 2023-05-01 RX ORDER — AMIODARONE HYDROCHLORIDE 200 MG/1
200 TABLET ORAL TWICE DAILY
Refills: 0 | Status: DISCONTINUED | COMMUNITY
Start: 2022-09-07 | End: 2023-05-01

## 2023-05-01 RX ORDER — METOPROLOL SUCCINATE 50 MG/1
50 TABLET, EXTENDED RELEASE ORAL
Refills: 0 | Status: ACTIVE | COMMUNITY
Start: 2022-08-22

## 2023-05-02 NOTE — REVIEW OF SYSTEMS
[Fatigue] : fatigue [Palpitations] : palpitations [Shortness Of Breath] : shortness of breath [Joint Pain] : joint pain [Joint Stiffness] : joint stiffness [Difficulty Walking] : difficulty walking [Negative] : Heme/Lymph [Fever] : no fever [Chills] : no chills [Night Sweats] : no night sweats [Recent Change In Weight] : ~T no recent weight change [Vision Problems] : no vision problems [Dysphagia] : no dysphagia [Nosebleeds] : no nosebleeds [Odynophagia] : no odynophagia [Chest Pain] : no chest pain [Lower Ext Edema] : no lower extremity edema [Wheezing] : no wheezing [Cough] : no cough [SOB on Exertion] : no shortness of breath during exertion [Dysuria] : no dysuria [Incontinence] : no incontinence [Muscle Pain] : no muscle pain [Muscle Weakness] : no muscle weakness [Confused] : no confusion [Dizziness] : no dizziness [Fainting] : no fainting [Insomnia] : no insomnia [Hot Flashes] : no hot flashes [FreeTextEntry5] : per HPI none at this time [FreeTextEntry6] : per HPI none at this time [FreeTextEntry9] : chronic pain in spine and hip [de-identified] : due to hip pain

## 2023-05-02 NOTE — PHYSICAL EXAM
[Normal] : affect appropriate [de-identified] : supple [de-identified] : (+)S1S2 irregular rhythm rate controlled [de-identified] : no edema [de-identified] : no pain on palpation [de-identified] : limited ROM in LE [de-identified] : warm/dry [Ambulatory and capable of all self care but unable to carry out any work activities] : Status 2- Ambulatory and capable of all self care but unable to carry out any work activities. Up and about more than 50% of waking hours [de-identified] : Cannot perform physical exam due to nature of telemedicine visit, however on telemedicine patient appears to not be in any acute distress

## 2023-05-02 NOTE — ASSESSMENT
[FreeTextEntry1] : 84 y/o F with extensive medical history including CAD s/p PCI x 2 and CABG x 4 (2013) c/b paroxysmal atrial fibrillation on Xarelto, aortic stenosis s/p TAVR in December 2020, bradycardia s/p PPM, COPD, osteoporosis on Prolia q 6 months c/b vertebral fractures, hiatal hernia c/b UGIB s/p corrective surgery in 2008 here for follow up of long-standing history of thrombocytopenia.\par \par On review of previous labs, patient has had a consistent thrombocytopenia over decades in the range of the 110s-120s. Differential diagnosis was previously discussed at initial consultation with the patient. She has a history of positive anticardiolipin antibody and has family history of APLS, however she does not qualify for this diagnosis (no history of miscarriage, no history of thrombosis). However, is it possible that her thrombocytopenia may be related to this antibody with an ITP type physiology. Regardless, up to this point her platelet count has been relatively stable and no intervention is needed. \par \par Previous downtrend possibly due to acute illnesses and amiodarone - now back to her baseline (most recently 104) and s/p recent back surgery. \par \par Plan:\par -Continue follow up with Cardiology for afib/aflutter, now s/p repeat ablation which she reports she feels improved from. Off amiodarone. \par -Platelet count most recently on 3/10/23 was 104. At baseline - continue to monitor q6months. \par -Patient understands and agrees with plan. All information explained to the best of my ability.\par -RTC in 6 months

## 2023-05-02 NOTE — HISTORY OF PRESENT ILLNESS
[Home] : at home, [unfilled] , at the time of the visit. 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 She has had low platelet levels since probably around 30 years ago. She is not aware of the lowest level, but she thinks it has always been over 100k. Other than her upper GI bleed related to the hiatal hernia she has not had any other bleeds. She reported that she has recently increased her sertraline as she feels like she is going into a depression. She feels the increased dose has helped a lot. Normal appetite, no night sweats. She has been trying to lose weight and at this point she has lost 25 pounds over a long period of time. Normal bowel movements, normal urinary habits. No new skin rashes. No nausea or vomiting, no diarrhea. 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 Of note, she has two daughters who are diagnosed with APLS. \par \par Since initial visit the patient has been found to have A-fib and was scheduled for DANE with cardioversion and possible ablation at [place] on 7/19/22. She states she was having palpitations and shortness of breath when this occurred. In July 2022 her platelets are 81K, last evaluated in April 2022 and were 87K. [de-identified] : Patient seen for follow up on 05/01/2023. \par On 2/6/23- she had an ablation for her afib/flutter which was effective. She is still taking the Xarelto though. Off amiodarone - it was causing her thyroid issues and she now has to be on synthroid. Platelet count has been around 104 on last check. No bleeding complications. She just had nerve block in her spine, she has debilitating back pain due to spinal problems. Recovering at home.

## 2023-05-08 ENCOUNTER — APPOINTMENT (OUTPATIENT)
Dept: ULTRASOUND IMAGING | Facility: CLINIC | Age: 83
End: 2023-05-08
Payer: MEDICARE

## 2023-05-08 PROCEDURE — 76536 US EXAM OF HEAD AND NECK: CPT

## 2023-05-19 ENCOUNTER — NON-APPOINTMENT (OUTPATIENT)
Age: 83
End: 2023-05-19

## 2023-05-19 DIAGNOSIS — N64.89 OTHER SPECIFIED DISORDERS OF BREAST: ICD-10-CM

## 2023-06-26 ENCOUNTER — APPOINTMENT (OUTPATIENT)
Dept: OBGYN | Facility: CLINIC | Age: 83
End: 2023-06-26

## 2023-08-03 ENCOUNTER — NON-APPOINTMENT (OUTPATIENT)
Age: 83
End: 2023-08-03

## 2023-11-02 ENCOUNTER — OUTPATIENT (OUTPATIENT)
Dept: OUTPATIENT SERVICES | Facility: HOSPITAL | Age: 83
LOS: 1 days | Discharge: HOME | End: 2023-11-02

## 2023-11-02 DIAGNOSIS — Z98.890 OTHER SPECIFIED POSTPROCEDURAL STATES: Chronic | ICD-10-CM

## 2023-11-02 DIAGNOSIS — D69.6 THROMBOCYTOPENIA, UNSPECIFIED: ICD-10-CM

## 2023-11-02 DIAGNOSIS — Z95.1 PRESENCE OF AORTOCORONARY BYPASS GRAFT: Chronic | ICD-10-CM

## 2023-11-06 ENCOUNTER — RESULT REVIEW (OUTPATIENT)
Age: 83
End: 2023-11-06

## 2023-11-06 ENCOUNTER — APPOINTMENT (OUTPATIENT)
Dept: HEMATOLOGY ONCOLOGY | Facility: CLINIC | Age: 83
End: 2023-11-06

## 2023-11-06 ENCOUNTER — APPOINTMENT (OUTPATIENT)
Dept: HEMATOLOGY ONCOLOGY | Facility: CLINIC | Age: 83
End: 2023-11-06
Payer: MEDICARE

## 2023-11-06 VITALS
BODY MASS INDEX: 29.26 KG/M2 | SYSTOLIC BLOOD PRESSURE: 119 MMHG | RESPIRATION RATE: 15 BRPM | TEMPERATURE: 97 F | OXYGEN SATURATION: 96 % | DIASTOLIC BLOOD PRESSURE: 78 MMHG | WEIGHT: 160 LBS | HEART RATE: 60 BPM

## 2023-11-06 DIAGNOSIS — R76.0 RAISED ANTIBODY TITER: ICD-10-CM

## 2023-11-06 DIAGNOSIS — D69.6 THROMBOCYTOPENIA, UNSPECIFIED: ICD-10-CM

## 2023-11-06 LAB
BASOPHILS # BLD AUTO: 0.02 K/UL — SIGNIFICANT CHANGE UP (ref 0–0.2)
BASOPHILS # BLD AUTO: 0.02 K/UL — SIGNIFICANT CHANGE UP (ref 0–0.2)
BASOPHILS NFR BLD AUTO: 0.4 % — SIGNIFICANT CHANGE UP (ref 0–2)
BASOPHILS NFR BLD AUTO: 0.4 % — SIGNIFICANT CHANGE UP (ref 0–2)
EOSINOPHIL # BLD AUTO: 0.07 K/UL — SIGNIFICANT CHANGE UP (ref 0–0.5)
EOSINOPHIL # BLD AUTO: 0.07 K/UL — SIGNIFICANT CHANGE UP (ref 0–0.5)
EOSINOPHIL NFR BLD AUTO: 1.3 % — SIGNIFICANT CHANGE UP (ref 0–6)
EOSINOPHIL NFR BLD AUTO: 1.3 % — SIGNIFICANT CHANGE UP (ref 0–6)
HCT VFR BLD CALC: 39.1 % — SIGNIFICANT CHANGE UP (ref 34.5–45)
HCT VFR BLD CALC: 39.1 % — SIGNIFICANT CHANGE UP (ref 34.5–45)
HGB BLD-MCNC: 13.2 G/DL — SIGNIFICANT CHANGE UP (ref 11.5–15.5)
HGB BLD-MCNC: 13.2 G/DL — SIGNIFICANT CHANGE UP (ref 11.5–15.5)
IMM GRANULOCYTES NFR BLD AUTO: 0.2 % — SIGNIFICANT CHANGE UP (ref 0–0.9)
IMM GRANULOCYTES NFR BLD AUTO: 0.2 % — SIGNIFICANT CHANGE UP (ref 0–0.9)
LYMPHOCYTES # BLD AUTO: 0.69 K/UL — LOW (ref 1–3.3)
LYMPHOCYTES # BLD AUTO: 0.69 K/UL — LOW (ref 1–3.3)
LYMPHOCYTES # BLD AUTO: 12.9 % — LOW (ref 13–44)
LYMPHOCYTES # BLD AUTO: 12.9 % — LOW (ref 13–44)
MCHC RBC-ENTMCNC: 32.4 PG — SIGNIFICANT CHANGE UP (ref 27–34)
MCHC RBC-ENTMCNC: 32.4 PG — SIGNIFICANT CHANGE UP (ref 27–34)
MCHC RBC-ENTMCNC: 33.8 G/DL — SIGNIFICANT CHANGE UP (ref 32–36)
MCHC RBC-ENTMCNC: 33.8 G/DL — SIGNIFICANT CHANGE UP (ref 32–36)
MCV RBC AUTO: 96.1 FL — SIGNIFICANT CHANGE UP (ref 80–100)
MCV RBC AUTO: 96.1 FL — SIGNIFICANT CHANGE UP (ref 80–100)
MONOCYTES # BLD AUTO: 0.62 K/UL — SIGNIFICANT CHANGE UP (ref 0–0.9)
MONOCYTES # BLD AUTO: 0.62 K/UL — SIGNIFICANT CHANGE UP (ref 0–0.9)
MONOCYTES NFR BLD AUTO: 11.6 % — SIGNIFICANT CHANGE UP (ref 2–14)
MONOCYTES NFR BLD AUTO: 11.6 % — SIGNIFICANT CHANGE UP (ref 2–14)
NEUTROPHILS # BLD AUTO: 3.94 K/UL — SIGNIFICANT CHANGE UP (ref 1.8–7.4)
NEUTROPHILS # BLD AUTO: 3.94 K/UL — SIGNIFICANT CHANGE UP (ref 1.8–7.4)
NEUTROPHILS NFR BLD AUTO: 73.6 % — SIGNIFICANT CHANGE UP (ref 43–77)
NEUTROPHILS NFR BLD AUTO: 73.6 % — SIGNIFICANT CHANGE UP (ref 43–77)
NRBC # BLD: 0 /100 WBCS — SIGNIFICANT CHANGE UP (ref 0–0)
NRBC # BLD: 0 /100 WBCS — SIGNIFICANT CHANGE UP (ref 0–0)
PLATELET # BLD AUTO: 109 K/UL — LOW (ref 150–400)
PLATELET # BLD AUTO: 109 K/UL — LOW (ref 150–400)
RBC # BLD: 4.07 M/UL — SIGNIFICANT CHANGE UP (ref 3.8–5.2)
RBC # BLD: 4.07 M/UL — SIGNIFICANT CHANGE UP (ref 3.8–5.2)
RBC # FLD: 13.3 % — SIGNIFICANT CHANGE UP (ref 10.3–14.5)
RBC # FLD: 13.3 % — SIGNIFICANT CHANGE UP (ref 10.3–14.5)
WBC # BLD: 5.35 K/UL — SIGNIFICANT CHANGE UP (ref 3.8–10.5)
WBC # BLD: 5.35 K/UL — SIGNIFICANT CHANGE UP (ref 3.8–10.5)
WBC # FLD AUTO: 5.35 K/UL — SIGNIFICANT CHANGE UP (ref 3.8–10.5)
WBC # FLD AUTO: 5.35 K/UL — SIGNIFICANT CHANGE UP (ref 3.8–10.5)

## 2023-11-06 PROCEDURE — 99213 OFFICE O/P EST LOW 20 MIN: CPT

## 2023-11-15 LAB
ALBUMIN SERPL ELPH-MCNC: 4.2 G/DL
ALP BLD-CCNC: 49 U/L
ALT SERPL-CCNC: 20 U/L
ANION GAP SERPL CALC-SCNC: 10 MMOL/L
AST SERPL-CCNC: 22 U/L
BILIRUB SERPL-MCNC: 0.4 MG/DL
BUN SERPL-MCNC: 27 MG/DL
CALCIUM SERPL-MCNC: 9.7 MG/DL
CHLORIDE SERPL-SCNC: 105 MMOL/L
CO2 SERPL-SCNC: 25 MMOL/L
CREAT SERPL-MCNC: 0.79 MG/DL
EGFR: 75 ML/MIN/1.73M2
GLUCOSE SERPL-MCNC: 72 MG/DL
POTASSIUM SERPL-SCNC: 4.8 MMOL/L
PROT SERPL-MCNC: 6.8 G/DL
SODIUM SERPL-SCNC: 140 MMOL/L

## 2023-11-24 ENCOUNTER — NON-APPOINTMENT (OUTPATIENT)
Age: 83
End: 2023-11-24

## 2023-11-25 ENCOUNTER — EMERGENCY (EMERGENCY)
Facility: HOSPITAL | Age: 83
LOS: 1 days | Discharge: HOME | End: 2023-11-25
Attending: EMERGENCY MEDICINE | Admitting: EMERGENCY MEDICINE
Payer: MEDICARE

## 2023-11-25 VITALS
WEIGHT: 160.06 LBS | OXYGEN SATURATION: 96 % | TEMPERATURE: 98 F | HEIGHT: 61 IN | HEART RATE: 69 BPM | SYSTOLIC BLOOD PRESSURE: 98 MMHG | DIASTOLIC BLOOD PRESSURE: 64 MMHG | RESPIRATION RATE: 15 BRPM

## 2023-11-25 VITALS
DIASTOLIC BLOOD PRESSURE: 65 MMHG | OXYGEN SATURATION: 97 % | SYSTOLIC BLOOD PRESSURE: 149 MMHG | TEMPERATURE: 98 F | RESPIRATION RATE: 17 BRPM | HEART RATE: 67 BPM

## 2023-11-25 DIAGNOSIS — Z98.890 OTHER SPECIFIED POSTPROCEDURAL STATES: Chronic | ICD-10-CM

## 2023-11-25 DIAGNOSIS — Z95.2 PRESENCE OF PROSTHETIC HEART VALVE: Chronic | ICD-10-CM

## 2023-11-25 DIAGNOSIS — Z98.49 CATARACT EXTRACTION STATUS, UNSPECIFIED EYE: Chronic | ICD-10-CM

## 2023-11-25 DIAGNOSIS — Z95.1 PRESENCE OF AORTOCORONARY BYPASS GRAFT: Chronic | ICD-10-CM

## 2023-11-25 LAB
ALBUMIN SERPL ELPH-MCNC: 3.5 G/DL — SIGNIFICANT CHANGE UP (ref 3.3–5)
ALBUMIN SERPL ELPH-MCNC: 3.5 G/DL — SIGNIFICANT CHANGE UP (ref 3.3–5)
ALP SERPL-CCNC: 36 U/L — SIGNIFICANT CHANGE UP (ref 30–120)
ALP SERPL-CCNC: 36 U/L — SIGNIFICANT CHANGE UP (ref 30–120)
ALT FLD-CCNC: 32 U/L — SIGNIFICANT CHANGE UP (ref 10–60)
ALT FLD-CCNC: 32 U/L — SIGNIFICANT CHANGE UP (ref 10–60)
ANION GAP SERPL CALC-SCNC: 11 MMOL/L — SIGNIFICANT CHANGE UP (ref 5–17)
ANION GAP SERPL CALC-SCNC: 11 MMOL/L — SIGNIFICANT CHANGE UP (ref 5–17)
APPEARANCE UR: CLEAR — SIGNIFICANT CHANGE UP
APPEARANCE UR: CLEAR — SIGNIFICANT CHANGE UP
AST SERPL-CCNC: 32 U/L — SIGNIFICANT CHANGE UP (ref 10–40)
AST SERPL-CCNC: 32 U/L — SIGNIFICANT CHANGE UP (ref 10–40)
BASOPHILS # BLD AUTO: 0.02 K/UL — SIGNIFICANT CHANGE UP (ref 0–0.2)
BASOPHILS # BLD AUTO: 0.02 K/UL — SIGNIFICANT CHANGE UP (ref 0–0.2)
BASOPHILS NFR BLD AUTO: 0.4 % — SIGNIFICANT CHANGE UP (ref 0–2)
BASOPHILS NFR BLD AUTO: 0.4 % — SIGNIFICANT CHANGE UP (ref 0–2)
BILIRUB SERPL-MCNC: 0.4 MG/DL — SIGNIFICANT CHANGE UP (ref 0.2–1.2)
BILIRUB SERPL-MCNC: 0.4 MG/DL — SIGNIFICANT CHANGE UP (ref 0.2–1.2)
BILIRUB UR-MCNC: NEGATIVE — SIGNIFICANT CHANGE UP
BILIRUB UR-MCNC: NEGATIVE — SIGNIFICANT CHANGE UP
BUN SERPL-MCNC: 28 MG/DL — HIGH (ref 7–23)
BUN SERPL-MCNC: 28 MG/DL — HIGH (ref 7–23)
CALCIUM SERPL-MCNC: 9.9 MG/DL — SIGNIFICANT CHANGE UP (ref 8.4–10.5)
CALCIUM SERPL-MCNC: 9.9 MG/DL — SIGNIFICANT CHANGE UP (ref 8.4–10.5)
CHLORIDE SERPL-SCNC: 101 MMOL/L — SIGNIFICANT CHANGE UP (ref 96–108)
CHLORIDE SERPL-SCNC: 101 MMOL/L — SIGNIFICANT CHANGE UP (ref 96–108)
CO2 SERPL-SCNC: 28 MMOL/L — SIGNIFICANT CHANGE UP (ref 22–31)
CO2 SERPL-SCNC: 28 MMOL/L — SIGNIFICANT CHANGE UP (ref 22–31)
COLOR SPEC: SIGNIFICANT CHANGE UP
COLOR SPEC: SIGNIFICANT CHANGE UP
CREAT SERPL-MCNC: 0.9 MG/DL — SIGNIFICANT CHANGE UP (ref 0.5–1.3)
CREAT SERPL-MCNC: 0.9 MG/DL — SIGNIFICANT CHANGE UP (ref 0.5–1.3)
DIFF PNL FLD: NEGATIVE — SIGNIFICANT CHANGE UP
DIFF PNL FLD: NEGATIVE — SIGNIFICANT CHANGE UP
EGFR: 64 ML/MIN/1.73M2 — SIGNIFICANT CHANGE UP
EGFR: 64 ML/MIN/1.73M2 — SIGNIFICANT CHANGE UP
EOSINOPHIL # BLD AUTO: 0.1 K/UL — SIGNIFICANT CHANGE UP (ref 0–0.5)
EOSINOPHIL # BLD AUTO: 0.1 K/UL — SIGNIFICANT CHANGE UP (ref 0–0.5)
EOSINOPHIL NFR BLD AUTO: 1.9 % — SIGNIFICANT CHANGE UP (ref 0–6)
EOSINOPHIL NFR BLD AUTO: 1.9 % — SIGNIFICANT CHANGE UP (ref 0–6)
FLUAV H3 RNA SPEC QL NAA+PROBE: DETECTED
FLUAV H3 RNA SPEC QL NAA+PROBE: DETECTED
GLUCOSE SERPL-MCNC: 85 MG/DL — SIGNIFICANT CHANGE UP (ref 70–99)
GLUCOSE SERPL-MCNC: 85 MG/DL — SIGNIFICANT CHANGE UP (ref 70–99)
GLUCOSE UR QL: NEGATIVE MG/DL — SIGNIFICANT CHANGE UP
GLUCOSE UR QL: NEGATIVE MG/DL — SIGNIFICANT CHANGE UP
HCT VFR BLD CALC: 44.4 % — SIGNIFICANT CHANGE UP (ref 34.5–45)
HCT VFR BLD CALC: 44.4 % — SIGNIFICANT CHANGE UP (ref 34.5–45)
HGB BLD-MCNC: 14.2 G/DL — SIGNIFICANT CHANGE UP (ref 11.5–15.5)
HGB BLD-MCNC: 14.2 G/DL — SIGNIFICANT CHANGE UP (ref 11.5–15.5)
IMM GRANULOCYTES NFR BLD AUTO: 0.2 % — SIGNIFICANT CHANGE UP (ref 0–0.9)
IMM GRANULOCYTES NFR BLD AUTO: 0.2 % — SIGNIFICANT CHANGE UP (ref 0–0.9)
KETONES UR-MCNC: ABNORMAL MG/DL
KETONES UR-MCNC: ABNORMAL MG/DL
LEUKOCYTE ESTERASE UR-ACNC: NEGATIVE — SIGNIFICANT CHANGE UP
LEUKOCYTE ESTERASE UR-ACNC: NEGATIVE — SIGNIFICANT CHANGE UP
LYMPHOCYTES # BLD AUTO: 0.75 K/UL — LOW (ref 1–3.3)
LYMPHOCYTES # BLD AUTO: 0.75 K/UL — LOW (ref 1–3.3)
LYMPHOCYTES # BLD AUTO: 14.1 % — SIGNIFICANT CHANGE UP (ref 13–44)
LYMPHOCYTES # BLD AUTO: 14.1 % — SIGNIFICANT CHANGE UP (ref 13–44)
MCHC RBC-ENTMCNC: 31.6 PG — SIGNIFICANT CHANGE UP (ref 27–34)
MCHC RBC-ENTMCNC: 31.6 PG — SIGNIFICANT CHANGE UP (ref 27–34)
MCHC RBC-ENTMCNC: 32 GM/DL — SIGNIFICANT CHANGE UP (ref 32–36)
MCHC RBC-ENTMCNC: 32 GM/DL — SIGNIFICANT CHANGE UP (ref 32–36)
MCV RBC AUTO: 98.7 FL — SIGNIFICANT CHANGE UP (ref 80–100)
MCV RBC AUTO: 98.7 FL — SIGNIFICANT CHANGE UP (ref 80–100)
MONOCYTES # BLD AUTO: 0.56 K/UL — SIGNIFICANT CHANGE UP (ref 0–0.9)
MONOCYTES # BLD AUTO: 0.56 K/UL — SIGNIFICANT CHANGE UP (ref 0–0.9)
MONOCYTES NFR BLD AUTO: 10.5 % — SIGNIFICANT CHANGE UP (ref 2–14)
MONOCYTES NFR BLD AUTO: 10.5 % — SIGNIFICANT CHANGE UP (ref 2–14)
NEUTROPHILS # BLD AUTO: 3.87 K/UL — SIGNIFICANT CHANGE UP (ref 1.8–7.4)
NEUTROPHILS # BLD AUTO: 3.87 K/UL — SIGNIFICANT CHANGE UP (ref 1.8–7.4)
NEUTROPHILS NFR BLD AUTO: 72.9 % — SIGNIFICANT CHANGE UP (ref 43–77)
NEUTROPHILS NFR BLD AUTO: 72.9 % — SIGNIFICANT CHANGE UP (ref 43–77)
NITRITE UR-MCNC: NEGATIVE — SIGNIFICANT CHANGE UP
NITRITE UR-MCNC: NEGATIVE — SIGNIFICANT CHANGE UP
NRBC # BLD: 0 /100 WBCS — SIGNIFICANT CHANGE UP (ref 0–0)
NRBC # BLD: 0 /100 WBCS — SIGNIFICANT CHANGE UP (ref 0–0)
NT-PROBNP SERPL-SCNC: 850 PG/ML — HIGH (ref 0–450)
NT-PROBNP SERPL-SCNC: 850 PG/ML — HIGH (ref 0–450)
PH UR: 5.5 — SIGNIFICANT CHANGE UP (ref 5–8)
PH UR: 5.5 — SIGNIFICANT CHANGE UP (ref 5–8)
PLATELET # BLD AUTO: 90 K/UL — LOW (ref 150–400)
PLATELET # BLD AUTO: 90 K/UL — LOW (ref 150–400)
POTASSIUM SERPL-MCNC: 4.4 MMOL/L — SIGNIFICANT CHANGE UP (ref 3.5–5.3)
POTASSIUM SERPL-MCNC: 4.4 MMOL/L — SIGNIFICANT CHANGE UP (ref 3.5–5.3)
POTASSIUM SERPL-SCNC: 4.4 MMOL/L — SIGNIFICANT CHANGE UP (ref 3.5–5.3)
POTASSIUM SERPL-SCNC: 4.4 MMOL/L — SIGNIFICANT CHANGE UP (ref 3.5–5.3)
PROT SERPL-MCNC: 7.6 G/DL — SIGNIFICANT CHANGE UP (ref 6–8.3)
PROT SERPL-MCNC: 7.6 G/DL — SIGNIFICANT CHANGE UP (ref 6–8.3)
PROT UR-MCNC: NEGATIVE MG/DL — SIGNIFICANT CHANGE UP
PROT UR-MCNC: NEGATIVE MG/DL — SIGNIFICANT CHANGE UP
RAPID RVP RESULT: DETECTED
RAPID RVP RESULT: DETECTED
RBC # BLD: 4.5 M/UL — SIGNIFICANT CHANGE UP (ref 3.8–5.2)
RBC # BLD: 4.5 M/UL — SIGNIFICANT CHANGE UP (ref 3.8–5.2)
RBC # FLD: 13.8 % — SIGNIFICANT CHANGE UP (ref 10.3–14.5)
RBC # FLD: 13.8 % — SIGNIFICANT CHANGE UP (ref 10.3–14.5)
SARS-COV-2 RNA SPEC QL NAA+PROBE: SIGNIFICANT CHANGE UP
SARS-COV-2 RNA SPEC QL NAA+PROBE: SIGNIFICANT CHANGE UP
SODIUM SERPL-SCNC: 140 MMOL/L — SIGNIFICANT CHANGE UP (ref 135–145)
SODIUM SERPL-SCNC: 140 MMOL/L — SIGNIFICANT CHANGE UP (ref 135–145)
SP GR SPEC: 1.02 — SIGNIFICANT CHANGE UP (ref 1–1.03)
SP GR SPEC: 1.02 — SIGNIFICANT CHANGE UP (ref 1–1.03)
TROPONIN I, HIGH SENSITIVITY RESULT: 13.2 NG/L — SIGNIFICANT CHANGE UP
TROPONIN I, HIGH SENSITIVITY RESULT: 13.2 NG/L — SIGNIFICANT CHANGE UP
UROBILINOGEN FLD QL: 1 MG/DL — SIGNIFICANT CHANGE UP (ref 0.2–1)
UROBILINOGEN FLD QL: 1 MG/DL — SIGNIFICANT CHANGE UP (ref 0.2–1)
WBC # BLD: 5.31 K/UL — SIGNIFICANT CHANGE UP (ref 3.8–10.5)
WBC # BLD: 5.31 K/UL — SIGNIFICANT CHANGE UP (ref 3.8–10.5)
WBC # FLD AUTO: 5.31 K/UL — SIGNIFICANT CHANGE UP (ref 3.8–10.5)
WBC # FLD AUTO: 5.31 K/UL — SIGNIFICANT CHANGE UP (ref 3.8–10.5)

## 2023-11-25 PROCEDURE — 85025 COMPLETE CBC W/AUTO DIFF WBC: CPT

## 2023-11-25 PROCEDURE — 93010 ELECTROCARDIOGRAM REPORT: CPT

## 2023-11-25 PROCEDURE — 80053 COMPREHEN METABOLIC PANEL: CPT

## 2023-11-25 PROCEDURE — 84484 ASSAY OF TROPONIN QUANT: CPT

## 2023-11-25 PROCEDURE — 71046 X-RAY EXAM CHEST 2 VIEWS: CPT | Mod: 26

## 2023-11-25 PROCEDURE — 0225U NFCT DS DNA&RNA 21 SARSCOV2: CPT

## 2023-11-25 PROCEDURE — 71046 X-RAY EXAM CHEST 2 VIEWS: CPT

## 2023-11-25 PROCEDURE — 93971 EXTREMITY STUDY: CPT

## 2023-11-25 PROCEDURE — 87086 URINE CULTURE/COLONY COUNT: CPT

## 2023-11-25 PROCEDURE — 93005 ELECTROCARDIOGRAM TRACING: CPT

## 2023-11-25 PROCEDURE — 81003 URINALYSIS AUTO W/O SCOPE: CPT

## 2023-11-25 PROCEDURE — 83880 ASSAY OF NATRIURETIC PEPTIDE: CPT

## 2023-11-25 PROCEDURE — 36415 COLL VENOUS BLD VENIPUNCTURE: CPT

## 2023-11-25 PROCEDURE — 99284 EMERGENCY DEPT VISIT MOD MDM: CPT

## 2023-11-25 PROCEDURE — 93971 EXTREMITY STUDY: CPT | Mod: 26,LT

## 2023-11-25 PROCEDURE — 99285 EMERGENCY DEPT VISIT HI MDM: CPT | Mod: 25

## 2023-11-25 RX ORDER — SPIRONOLACTONE 25 MG/1
1 TABLET, FILM COATED ORAL
Refills: 0 | DISCHARGE

## 2023-11-25 RX ORDER — LABETALOL HCL 100 MG
1 TABLET ORAL
Qty: 0 | Refills: 0 | DISCHARGE

## 2023-11-25 RX ORDER — ASPIRIN/CALCIUM CARB/MAGNESIUM 324 MG
1 TABLET ORAL
Refills: 0 | DISCHARGE

## 2023-11-25 RX ORDER — SERTRALINE 25 MG/1
1 TABLET, FILM COATED ORAL
Qty: 0 | Refills: 0 | DISCHARGE

## 2023-11-25 RX ORDER — SODIUM CHLORIDE 9 MG/ML
1000 INJECTION INTRAMUSCULAR; INTRAVENOUS; SUBCUTANEOUS ONCE
Refills: 0 | Status: COMPLETED | OUTPATIENT
Start: 2023-11-25 | End: 2023-11-25

## 2023-11-25 RX ORDER — LOSARTAN POTASSIUM 100 MG/1
1 TABLET, FILM COATED ORAL
Refills: 0 | DISCHARGE

## 2023-11-25 RX ORDER — CHOLECALCIFEROL (VITAMIN D3) 125 MCG
1 CAPSULE ORAL
Qty: 0 | Refills: 0 | DISCHARGE

## 2023-11-25 RX ORDER — LEVOTHYROXINE SODIUM 125 MCG
1 TABLET ORAL
Refills: 0 | DISCHARGE

## 2023-11-25 RX ORDER — EZETIMIBE 10 MG/1
0 TABLET ORAL
Qty: 0 | Refills: 0 | DISCHARGE

## 2023-11-25 RX ORDER — METOPROLOL TARTRATE 50 MG
1 TABLET ORAL
Refills: 0 | DISCHARGE

## 2023-11-25 RX ORDER — BUDESONIDE AND FORMOTEROL FUMARATE DIHYDRATE 160; 4.5 UG/1; UG/1
2 AEROSOL RESPIRATORY (INHALATION)
Qty: 0 | Refills: 0 | DISCHARGE

## 2023-11-25 RX ORDER — RIVAROXABAN 15 MG-20MG
1 KIT ORAL
Qty: 0 | Refills: 0 | DISCHARGE

## 2023-11-25 RX ORDER — ATORVASTATIN CALCIUM 80 MG/1
1 TABLET, FILM COATED ORAL
Qty: 0 | Refills: 0 | DISCHARGE

## 2023-11-25 RX ORDER — FAMOTIDINE 10 MG/ML
1 INJECTION INTRAVENOUS
Refills: 0 | DISCHARGE

## 2023-11-25 RX ADMIN — SODIUM CHLORIDE 1000 MILLILITER(S): 9 INJECTION INTRAMUSCULAR; INTRAVENOUS; SUBCUTANEOUS at 14:18

## 2023-11-25 NOTE — ED ADULT NURSE NOTE - NSICDXPASTSURGICALHX_GEN_ALL_CORE_FT
PAST SURGICAL HISTORY:  History of quadruple bypass     S/P repair of paraesophageal hernia      PAST SURGICAL HISTORY:  H/O prior ablation treatment     History of cataract surgery     History of quadruple bypass     History of transcatheter aortic valve replacement (TAVR)     S/P repair of paraesophageal hernia

## 2023-11-25 NOTE — ED PROVIDER NOTE - NSFOLLOWUPINSTRUCTIONS_ED_ALL_ED_FT
Chronic Obstructive Pulmonary Disease Exacerbation    Chronic obstructive pulmonary disease (COPD) is a long-term (chronic) lung problem. In COPD, the flow of air from the lungs is limited.    COPD exacerbations are times that breathing gets worse and you need more than your normal treatment. Without treatment, they can be life-threatening. If they happen often, your lungs can become more damaged.    What are the causes?  Having infections that affect your airways and lungs.  Being exposed to:  Smoke.  Air pollution.  Chemical fumes.  Dust.  Things that can cause an allergic reaction (allergens).  Not taking your usual COPD medicines as told.  Having medical problems already, such as heart failure or infections not involving the lungs.  In many cases, the cause is not known.    What increases the risk?  Smoking.  Being an older adult.  Having frequent prior COPD exacerbations.  What are the signs or symptoms?  Increased coughing.  Increased mucus from your lungs.  Increased wheezing.  Increased shortness of breath.  Fast breathing and finding it hard to breathe.  Chest tightness.  Less energy than usual.  Sleep disruption from symptoms.  Confusion.  Increased sleepiness.  Often, these symptoms happen or get worse even with the use of medicines.    How is this treated?  Treatment for this condition depends on how bad it is and the cause of the symptoms. You may need to stay in the hospital for treatment. Treatment may include:  Taking medicines.  Using oxygen.  Being treated with different ways to clear your airway, such as using a mask to deliver oxygen.  Follow these instructions at home:  Medicines    Take over-the-counter and prescription medicines only as told by your doctor.  Use all inhaled medicines the correct way.  If you were prescribed an antibiotic or steroid medicine, take it as told by your doctor. Do not stop taking it even if you start to feel better.  Lifestyle    Do not smoke or use any products that contain nicotine or tobacco. If you need help quitting, ask your doctor.  Eat healthy foods.  Exercise regularly.  Get enough sleep. Most adults need 7 or more hours per night.  Avoid tobacco smoke and other things that can bother your lungs.  Several times a day, wash your hands with soap and water for at least 20 seconds. If you cannot use soap and water, use hand . This may help keep you from getting an infection.  During flu season, avoid areas that are crowded with people.  General instructions    Drink enough fluid to keep your pee (urine) pale yellow. Do not do this if your doctor has told you not to.  Use a cool mist machine (vaporizer).  If you use oxygen or a machine that turns medicine into a mist (nebulizer), continue to use it as told.  Keep all follow-up visits.  How is this prevented?  Keep up with shots (vaccinations) as told by your doctor. Be sure to get a yearly flu (influenza) shot.  If you smoke, quit smoking. Smoking makes the problem worse.  Follow all instructions for rehabilitation. These are steps you can take to make your body work better.  Work with your doctor to develop and follow an action plan. This tells you what steps to take when you experience certain symptoms.  Contact a doctor if:  Your COPD symptoms get worse than normal.  Get help right away if:  You are short of breath and it gets worse, even when you are resting.  You have trouble talking.  You have chest pain.  You cough up blood.  You have a fever.  You keep vomiting.  You feel weak or you pass out (faint).  You feel confused.  You are not able to sleep because of your symptoms.  You have trouble doing daily activities.  These symptoms may be an emergency. Get help right away. Call your local emergency services (911 in the [place]  Do not wait to see if the symptoms will go away.  Do not drive yourself to the hospital.  Summary  COPD exacerbations are times that breathing gets worse and you need more treatment than normal.  COPD exacerbations can be very serious and may cause your lungs to become more damaged.  Do not smoke. If you need help quitting, ask your doctor.  Stay up to date on your shots. Get a flu shot every year.  This information is not intended to replace advice given to you by your health care provider. Make sure you discuss any questions you have with your health care provider.

## 2023-11-25 NOTE — ED PROVIDER NOTE - NSICDXPASTMEDICALHX_GEN_ALL_CORE_FT
PAST MEDICAL HISTORY:  Arthritis     Atrial fibrillation     Chronic obstructive pulmonary disease     Coronary artery disease     Hypertension

## 2023-11-25 NOTE — ED ADULT NURSE NOTE - NSICDXPASTMEDICALHX_GEN_ALL_CORE_FT
PAST MEDICAL HISTORY:  Atrial fibrillation     Chronic obstructive pulmonary disease     Coronary artery disease     Hypertension      PAST MEDICAL HISTORY:  Arthritis     Atrial fibrillation     Chronic obstructive pulmonary disease     Coronary artery disease     Hypertension

## 2023-11-25 NOTE — ED PROVIDER NOTE - CLINICAL SUMMARY MEDICAL DECISION MAKING FREE TEXT BOX
83-year-old female with URI symptoms at urgent care with low BP on antihypertensives.  X-ray rule out pneumonia, RVP rule out viral etiology of symptoms, cardiac workup rule out ACS.  BMP rule out dehydration.  Orthostatics.

## 2023-11-25 NOTE — ED ADULT NURSE NOTE - SPUTUM COLOR
diffuse/diffuse scab lesions in hips, abdomen, back, L hand, upper extremities, back of neck and scalp,
yellow

## 2023-11-25 NOTE — ED PROVIDER NOTE - PATIENT PORTAL LINK FT
You can access the FollowMyHealth Patient Portal offered by [facility] by registering at the following website: [email or web address]. By joining [facility]’s FollowMyHealth portal, you will also be able to view your health information using other applications (apps) compatible with our system.

## 2023-11-25 NOTE — ED PROVIDER NOTE - OBJECTIVE STATEMENT
83-year-old female with significant cardiac history, pacemaker, A-fib, MI x 2 presents to the ED from the urgent care with complaints of low blood pressure associated with chest congestion times few days.  Patient is on metoprolol, losartan.  Patient complaining of cough, chest congestion.  Patient denies any chest pain. 83-year-old female with significant cardiac history, pacemaker, A-fib, MI x 2, COPD, presents to the ED from the urgent care with complaints of low blood pressure associated with chest congestion times few days.  Patient is on metoprolol, losartan.  Patient complaining of cough, chest congestion and urinary frequency.  Patient denies any chest pain.

## 2023-11-25 NOTE — ED ADULT NURSE NOTE - NSFALLHARMRISKINTERV_ED_ALL_ED
Assistance OOB with selected safe patient handling equipment if applicable/Assistance with ambulation/Communicate risk of Fall with Harm to all staff, patient, and family/Monitor gait and stability/Provide patient with walking aids/Provide visual cue: red socks, yellow wristband, yellow gown, etc/Reinforce activity limits and safety measures with patient and family/Use of alarms - bed, stretcher, chair and/or video monitoring/Bed in lowest position, wheels locked, appropriate side rails in place/Call bell, personal items and telephone in reach/Instruct patient to call for assistance before getting out of bed/chair/stretcher/Non-slip footwear applied when patient is off stretcher/[place] to call system/Physically safe environment - no spills, clutter or unnecessary equipment/Purposeful Proactive Rounding/Room/bathroom lighting operational, light cord in reach

## 2023-11-25 NOTE — ED ADULT NURSE NOTE - OBJECTIVE STATEMENT
pt arrives from urgent care with c/o "head cold since Wednesday night, scratchy throat and cough. yellow mucous." denies fever/chills. reports normal appetite. pt states urgent care was concerned about LLE looking swollen. covid swab was negative at urgent care. urgent care called report prior to pt arrival, reporting pt also hypotensive 80/54, asymptomatic. pt denies calf pain bilat, states just pain in the varicose veins to bilat LE. no significant size different noted at this time. pt reports hx chronic back and bilat wrist pain. pt arrives from urgent care with c/o "head cold since Wednesday night, scratchy throat and cough. yellow mucous." denies fever/chills. denies CP. reports normal appetite. pt states urgent care was concerned about LLE looking swollen. covid swab was negative at urgent care. urgent care called report prior to pt arrival, reporting pt also hypotensive 80/54, asymptomatic. pt denies calf pain bilat, states just pain in the varicose veins to bilat LE. no significant size different noted at this time. pt reports hx chronic back and bilat wrist pain.

## 2023-11-27 LAB
CULTURE RESULTS: SIGNIFICANT CHANGE UP
CULTURE RESULTS: SIGNIFICANT CHANGE UP
SPECIMEN SOURCE: SIGNIFICANT CHANGE UP
SPECIMEN SOURCE: SIGNIFICANT CHANGE UP

## 2024-02-07 NOTE — ED ADULT NURSE NOTE - EENT ASSESSMENT, MLM
FINDINGS: Pulmonary Arteries: No obvious filling defect in the pulmonary arterial vasculature that meets the criteria for pulmonary embolus.  Enlargement of the central main pulmonary artery measures 3.9 cm transversely which can be seen with pulmonary arterial hypertension. Mediastinum: Visualized thyroid gland grossly unremarkable in appearance. Atheromatous plaque and atherosclerotic calcification of the aorta and branch vasculature.  No pericardial or pleural effusions.  No periaortic or mediastinal hemorrhage.  No axillary, mediastinal, or hilar lymphadenopathy. Lungs/pleura: Trachea and proximal central airways appear patent. No pneumothorax.  5 mm subpleural left upper lobe pulmonary nodule on image 141, series 602 and image 39, series 3. 5 mm left upper lobe pulmonary nodule on image 12, series 3.  Additional pulmonary nodules scattered throughout the left upper lobe likely infectious or inflammatory in etiology. Mild dependent atelectasis, parenchymal banding, and respiratory motion. Upper Abdomen: Fatty liver.  Partial visualization of the upper abdomen otherwise grossly unremarkable. Soft Tissues/Bones: Mild diffuse degenerative changes throughout the spine.     1. Multiple scattered pulmonary nodules throughout the left upper lobe measuring up to 5 mm most likely representing infectious or inflammatory etiology.  Follow-up is recommended to document resolution.  Mild dependent atelectasis, parenchymal banding and respiratory motion. 2. No clear evidence for central pulmonary embolus.  Probable pulmonary arterial hypertension. 3. Fatty liver. 4. Atheromatous plaque and atherosclerotic calcification of the aorta and branch vasculature. RECOMMENDATIONS: Multiple pulmonary nodules. Most significant: 5 mm left solid pulmonary nodule within the upper lobe. Per Fleischner Society Guidelines, if patient is low risk for malignancy, no routine follow-up imaging is recommended. If patient is high risk for 
WDL

## 2024-03-07 PROBLEM — M19.90 UNSPECIFIED OSTEOARTHRITIS, UNSPECIFIED SITE: Chronic | Status: ACTIVE | Noted: 2023-11-25

## 2024-03-13 ENCOUNTER — APPOINTMENT (OUTPATIENT)
Dept: ULTRASOUND IMAGING | Facility: CLINIC | Age: 84
End: 2024-03-13
Payer: MEDICARE

## 2024-03-13 PROCEDURE — 76536 US EXAM OF HEAD AND NECK: CPT

## 2024-03-14 ENCOUNTER — APPOINTMENT (OUTPATIENT)
Dept: OBGYN | Facility: CLINIC | Age: 84
End: 2024-03-14
Payer: MEDICARE

## 2024-03-14 PROCEDURE — 77080 DXA BONE DENSITY AXIAL: CPT

## 2024-07-19 ENCOUNTER — APPOINTMENT (OUTPATIENT)
Dept: OBGYN | Facility: CLINIC | Age: 84
End: 2024-07-19

## 2024-07-19 ENCOUNTER — APPOINTMENT (OUTPATIENT)
Dept: OBGYN | Facility: CLINIC | Age: 84
End: 2024-07-19
Payer: MEDICARE

## 2024-07-19 ENCOUNTER — ASOB RESULT (OUTPATIENT)
Age: 84
End: 2024-07-19

## 2024-07-19 VITALS
DIASTOLIC BLOOD PRESSURE: 60 MMHG | SYSTOLIC BLOOD PRESSURE: 109 MMHG | WEIGHT: 151 LBS | BODY MASS INDEX: 27.79 KG/M2 | HEIGHT: 62 IN

## 2024-07-19 DIAGNOSIS — K64.4 RESIDUAL HEMORRHOIDAL SKIN TAGS: ICD-10-CM

## 2024-07-19 DIAGNOSIS — N81.11 CYSTOCELE, MIDLINE: ICD-10-CM

## 2024-07-19 DIAGNOSIS — N90.5 ATROPHY OF VULVA: ICD-10-CM

## 2024-07-19 DIAGNOSIS — M81.0 AGE-RELATED OSTEOPOROSIS W/OUT CURRENT PATHOLOGICAL FRACTURE: ICD-10-CM

## 2024-07-19 DIAGNOSIS — R92.30 DENSE BREASTS, UNSPECIFIED: ICD-10-CM

## 2024-07-19 PROCEDURE — 76830 TRANSVAGINAL US NON-OB: CPT

## 2024-07-19 PROCEDURE — G0101: CPT

## 2024-07-25 RX ORDER — LIDOCAINE HCL AND HYDROCORTISONE ACETATE 30; 5 MG/G; MG/G
3-0.5 CREAM RECTAL; TOPICAL
Qty: 1 | Refills: 0 | Status: ACTIVE | COMMUNITY
Start: 2024-07-19 | End: 1900-01-01

## 2024-07-25 RX ORDER — LIDOCAINE 5 G/100G
5 OINTMENT TOPICAL
Qty: 1 | Refills: 1 | Status: ACTIVE | COMMUNITY
Start: 2024-07-19 | End: 1900-01-01

## 2024-07-26 ENCOUNTER — NON-APPOINTMENT (OUTPATIENT)
Age: 84
End: 2024-07-26

## 2024-07-27 ENCOUNTER — NON-APPOINTMENT (OUTPATIENT)
Age: 84
End: 2024-07-27

## 2024-08-16 ENCOUNTER — NON-APPOINTMENT (OUTPATIENT)
Age: 84
End: 2024-08-16

## 2024-09-16 ENCOUNTER — NON-APPOINTMENT (OUTPATIENT)
Age: 84
End: 2024-09-16

## 2024-11-04 NOTE — ED ADULT NURSE REASSESSMENT NOTE - NS ED NURSE REASSESS COMMENT FT1
Fasting labs has been ordered for complete physicals   pt feels better able to change position pt re evaluated by md and to be d'c/d  iv d'c/d  no s/s infiltration upon removal  pt discharged stable and ambulatory in nad at present d/c instruction reinforced and pt verbalized understanding vital signs as charted  pt advised to use salon pas put on in morning and remove at night only for 12 hours and pt and spouse verbalized understanding also advised to take stool softeners with pain meds and keep appointment with pain for tuesday

## 2025-01-13 ENCOUNTER — NON-APPOINTMENT (OUTPATIENT)
Age: 85
End: 2025-01-13

## 2025-02-11 DIAGNOSIS — R92.8 OTHER ABNORMAL AND INCONCLUSIVE FINDINGS ON DIAGNOSTIC IMAGING OF BREAST: ICD-10-CM

## 2025-08-28 ENCOUNTER — NON-APPOINTMENT (OUTPATIENT)
Age: 85
End: 2025-08-28

## 2025-08-29 ENCOUNTER — APPOINTMENT (OUTPATIENT)
Dept: ULTRASOUND IMAGING | Facility: CLINIC | Age: 85
End: 2025-08-29
Payer: MEDICARE

## 2025-08-29 ENCOUNTER — APPOINTMENT (OUTPATIENT)
Dept: OBGYN | Facility: CLINIC | Age: 85
End: 2025-08-29
Payer: MEDICARE

## 2025-08-29 PROCEDURE — 77080 DXA BONE DENSITY AXIAL: CPT

## 2025-08-29 PROCEDURE — 76536 US EXAM OF HEAD AND NECK: CPT | Mod: TC
